# Patient Record
Sex: FEMALE | Race: BLACK OR AFRICAN AMERICAN | NOT HISPANIC OR LATINO
[De-identification: names, ages, dates, MRNs, and addresses within clinical notes are randomized per-mention and may not be internally consistent; named-entity substitution may affect disease eponyms.]

---

## 2020-02-12 ENCOUNTER — APPOINTMENT (OUTPATIENT)
Dept: PULMONOLOGY | Facility: CLINIC | Age: 45
End: 2020-02-12
Payer: COMMERCIAL

## 2020-02-12 ENCOUNTER — NON-APPOINTMENT (OUTPATIENT)
Age: 45
End: 2020-02-12

## 2020-02-12 ENCOUNTER — LABORATORY RESULT (OUTPATIENT)
Age: 45
End: 2020-02-12

## 2020-02-12 ENCOUNTER — APPOINTMENT (OUTPATIENT)
Dept: CARDIOLOGY | Facility: CLINIC | Age: 45
End: 2020-02-12
Payer: COMMERCIAL

## 2020-02-12 VITALS
OXYGEN SATURATION: 97 % | BODY MASS INDEX: 50.02 KG/M2 | HEART RATE: 94 BPM | DIASTOLIC BLOOD PRESSURE: 90 MMHG | SYSTOLIC BLOOD PRESSURE: 138 MMHG | WEIGHT: 293 LBS | TEMPERATURE: 98.1 F | HEIGHT: 64 IN

## 2020-02-12 VITALS — SYSTOLIC BLOOD PRESSURE: 130 MMHG | DIASTOLIC BLOOD PRESSURE: 90 MMHG

## 2020-02-12 DIAGNOSIS — Z82.3 FAMILY HISTORY OF STROKE: ICD-10-CM

## 2020-02-12 DIAGNOSIS — M25.561 PAIN IN RIGHT KNEE: ICD-10-CM

## 2020-02-12 DIAGNOSIS — R25.2 CRAMP AND SPASM: ICD-10-CM

## 2020-02-12 DIAGNOSIS — Z80.1 FAMILY HISTORY OF MALIGNANT NEOPLASM OF TRACHEA, BRONCHUS AND LUNG: ICD-10-CM

## 2020-02-12 DIAGNOSIS — R09.81 NASAL CONGESTION: ICD-10-CM

## 2020-02-12 PROCEDURE — 99386 PREV VISIT NEW AGE 40-64: CPT

## 2020-02-12 PROCEDURE — 93000 ELECTROCARDIOGRAM COMPLETE: CPT

## 2020-02-12 PROCEDURE — 71046 X-RAY EXAM CHEST 2 VIEWS: CPT

## 2020-02-12 NOTE — PHYSICAL EXAM
[Well Developed] : well developed [No Acute Distress] : no acute distress [Well Nourished] : well nourished [Well-Appearing] : well-appearing [Normal Sclera/Conjunctiva] : normal sclera/conjunctiva [EOMI] : extraocular movements intact [PERRL] : pupils equal round and reactive to light [Normal Outer Ear/Nose] : the outer ears and nose were normal in appearance [No JVD] : no jugular venous distention [No Lymphadenopathy] : no lymphadenopathy [Supple] : supple [Thyroid Normal, No Nodules] : the thyroid was normal and there were no nodules present [No Respiratory Distress] : no respiratory distress  [Clear to Auscultation] : lungs were clear to auscultation bilaterally [Normal Rate] : normal rate  [No Accessory Muscle Use] : no accessory muscle use [Normal S1, S2] : normal S1 and S2 [Regular Rhythm] : with a regular rhythm [No Murmur] : no murmur heard [No Carotid Bruits] : no carotid bruits [No Abdominal Bruit] : a ~M bruit was not heard ~T in the abdomen [No Varicosities] : no varicosities [Pedal Pulses Present] : the pedal pulses are present [No Edema] : there was no peripheral edema [No Palpable Aorta] : no palpable aorta [No Extremity Clubbing/Cyanosis] : no extremity clubbing/cyanosis [Soft] : abdomen soft [Non Tender] : non-tender [No Masses] : no abdominal mass palpated [No HSM] : no HSM [Non-distended] : non-distended [Normal Bowel Sounds] : normal bowel sounds [Normal Posterior Cervical Nodes] : no posterior cervical lymphadenopathy [Normal Anterior Cervical Nodes] : no anterior cervical lymphadenopathy [No CVA Tenderness] : no CVA  tenderness [No Spinal Tenderness] : no spinal tenderness [No Joint Swelling] : no joint swelling [Grossly Normal Strength/Tone] : grossly normal strength/tone [No Rash] : no rash [Coordination Grossly Intact] : coordination grossly intact [Normal Gait] : normal gait [No Focal Deficits] : no focal deficits [Normal Affect] : the affect was normal [Normal Insight/Judgement] : insight and judgment were intact [Deep Tendon Reflexes (DTR)] : deep tendon reflexes were 2+ and symmetric [de-identified] : Mild erythema to the oropharynx, nasal turbinates pink and boggy

## 2020-02-12 NOTE — HISTORY OF PRESENT ILLNESS
[de-identified] : This is a 45 year old lady with a PMH of CHF, Obesity, Current Smoker,  and HTN presents today to establish care at our office. Patient states that she is overall feeling OK. Patient states that she has been feeling congested since last week. Patient denies sinus pressure, sore throat, cough and fever. Patient states that she went to Urgent care and she was prescribed Flonase, which she has been using infrequently. Patient states that she has been experiencing some left leg cramping at night. Patient also with chronic right knee pain that hurts while she walks. She states that she hears a "clicking" noise when she walks. Patient is a current smoker, smokes 6 cigarettes/day.  [FreeTextEntry1] : Establish Care at our office

## 2020-02-17 LAB
25(OH)D3 SERPL-MCNC: 10.4 NG/ML
ANION GAP SERPL CALC-SCNC: 14 MMOL/L
APPEARANCE: ABNORMAL
BACTERIA: NEGATIVE
BASOPHILS # BLD AUTO: 0.03 K/UL
BASOPHILS NFR BLD AUTO: 0.3 %
BILIRUBIN URINE: NEGATIVE
BLOOD URINE: ABNORMAL
BUN SERPL-MCNC: 16 MG/DL
CALCIUM OXALATE CRYSTALS: ABNORMAL
CALCIUM SERPL-MCNC: 10 MG/DL
CHLORIDE SERPL-SCNC: 103 MMOL/L
CHOLEST SERPL-MCNC: 167 MG/DL
CHOLEST/HDLC SERPL: 3.9 RATIO
CO2 SERPL-SCNC: 23 MMOL/L
COLOR: YELLOW
CREAT SERPL-MCNC: 1.38 MG/DL
EOSINOPHIL # BLD AUTO: 0.32 K/UL
EOSINOPHIL NFR BLD AUTO: 2.9 %
ESTIMATED AVERAGE GLUCOSE: 131 MG/DL
GLUCOSE QUALITATIVE U: NEGATIVE
GLUCOSE SERPL-MCNC: 85 MG/DL
HBA1C MFR BLD HPLC: 6.2 %
HCT VFR BLD CALC: 35.8 %
HDLC SERPL-MCNC: 42 MG/DL
HGB BLD-MCNC: 10.6 G/DL
HYALINE CASTS: 0 /LPF
IMM GRANULOCYTES NFR BLD AUTO: 0.7 %
KETONES URINE: NEGATIVE
LDLC SERPL CALC-MCNC: 83 MG/DL
LEUKOCYTE ESTERASE URINE: NEGATIVE
LYMPHOCYTES # BLD AUTO: 3.19 K/UL
LYMPHOCYTES NFR BLD AUTO: 28.9 %
MAGNESIUM SERPL-MCNC: 2.3 MG/DL
MAN DIFF?: NORMAL
MCHC RBC-ENTMCNC: 25 PG
MCHC RBC-ENTMCNC: 29.6 GM/DL
MCV RBC AUTO: 84.4 FL
MICROSCOPIC-UA: NORMAL
MONOCYTES # BLD AUTO: 0.51 K/UL
MONOCYTES NFR BLD AUTO: 4.6 %
NEUTROPHILS # BLD AUTO: 6.89 K/UL
NEUTROPHILS NFR BLD AUTO: 62.6 %
NITRITE URINE: NEGATIVE
NT-PROBNP SERPL-MCNC: 341 PG/ML
PH URINE: 6
PHOSPHATE SERPL-MCNC: 4.6 MG/DL
PLATELET # BLD AUTO: 315 K/UL
POTASSIUM SERPL-SCNC: 4 MMOL/L
PROTEIN URINE: ABNORMAL
RBC # BLD: 4.24 M/UL
RBC # FLD: 16.2 %
RED BLOOD CELLS URINE: 12 /HPF
SODIUM SERPL-SCNC: 141 MMOL/L
SPECIFIC GRAVITY URINE: 1.03
SQUAMOUS EPITHELIAL CELLS: 6 /HPF
T3RU NFR SERPL: 1 TBI
T4 FREE SERPL-MCNC: 1 NG/DL
T4 SERPL-MCNC: 5.7 UG/DL
TRIGL SERPL-MCNC: 209 MG/DL
TSH SERPL-ACNC: 1.78 UIU/ML
UROBILINOGEN URINE: NORMAL
WBC # FLD AUTO: 11.02 K/UL
WHITE BLOOD CELLS URINE: 5 /HPF

## 2020-02-19 ENCOUNTER — APPOINTMENT (OUTPATIENT)
Dept: CT IMAGING | Facility: CLINIC | Age: 45
End: 2020-02-19

## 2020-02-19 ENCOUNTER — APPOINTMENT (OUTPATIENT)
Dept: CARDIOLOGY | Facility: CLINIC | Age: 45
End: 2020-02-19
Payer: COMMERCIAL

## 2020-02-19 PROCEDURE — 78452 HT MUSCLE IMAGE SPECT MULT: CPT

## 2020-02-19 PROCEDURE — 93306 TTE W/DOPPLER COMPLETE: CPT

## 2020-02-19 PROCEDURE — A9500: CPT

## 2020-02-19 PROCEDURE — 93015 CV STRESS TEST SUPVJ I&R: CPT

## 2020-02-20 ENCOUNTER — APPOINTMENT (OUTPATIENT)
Dept: CARDIOLOGY | Facility: CLINIC | Age: 45
End: 2020-02-20

## 2020-02-24 ENCOUNTER — APPOINTMENT (OUTPATIENT)
Dept: CARDIOLOGY | Facility: CLINIC | Age: 45
End: 2020-02-24
Payer: COMMERCIAL

## 2020-02-24 ENCOUNTER — NON-APPOINTMENT (OUTPATIENT)
Age: 45
End: 2020-02-24

## 2020-02-24 VITALS
SYSTOLIC BLOOD PRESSURE: 140 MMHG | HEART RATE: 90 BPM | BODY MASS INDEX: 50.02 KG/M2 | OXYGEN SATURATION: 99 % | DIASTOLIC BLOOD PRESSURE: 90 MMHG | TEMPERATURE: 98.1 F | WEIGHT: 293 LBS | HEIGHT: 64 IN

## 2020-02-24 DIAGNOSIS — Z86.79 PERSONAL HISTORY OF OTHER DISEASES OF THE CIRCULATORY SYSTEM: ICD-10-CM

## 2020-02-24 DIAGNOSIS — Z86.39 PERSONAL HISTORY OF OTHER ENDOCRINE, NUTRITIONAL AND METABOLIC DISEASE: ICD-10-CM

## 2020-02-24 DIAGNOSIS — R94.39 ABNORMAL RESULT OF OTHER CARDIOVASCULAR FUNCTION STUDY: ICD-10-CM

## 2020-02-24 PROCEDURE — 93000 ELECTROCARDIOGRAM COMPLETE: CPT

## 2020-02-24 PROCEDURE — 99214 OFFICE O/P EST MOD 30 MIN: CPT

## 2020-02-24 RX ORDER — LISINOPRIL 40 MG/1
40 TABLET ORAL DAILY
Qty: 90 | Refills: 3 | Status: DISCONTINUED | COMMUNITY
End: 2020-02-24

## 2020-02-24 NOTE — PHYSICAL EXAM
[FreeTextEntry1] : obese [Normal Conjunctiva] : the conjunctiva exhibited no abnormalities [Eyelids - No Xanthelasma] : the eyelids demonstrated no xanthelasmas [Normal Oral Mucosa] : normal oral mucosa [No Oral Pallor] : no oral pallor [No Oral Cyanosis] : no oral cyanosis [Normal Jugular Venous A Waves Present] : normal jugular venous A waves present [Normal Jugular Venous V Waves Present] : normal jugular venous V waves present [No Jugular Venous Clay A Waves] : no jugular venous clay A waves [Respiration, Rhythm And Depth] : normal respiratory rhythm and effort [Exaggerated Use Of Accessory Muscles For Inspiration] : no accessory muscle use [Auscultation Breath Sounds / Voice Sounds] : lungs were clear to auscultation bilaterally [Heart Rate And Rhythm] : heart rate and rhythm were normal [Heart Sounds] : normal S1 and S2 [Murmurs] : no murmurs present [Abdomen Soft] : soft [Abdomen Tenderness] : non-tender [Abdomen Mass (___ Cm)] : no abdominal mass palpated [Abnormal Walk] : normal gait [Gait - Sufficient For Exercise Testing] : the gait was sufficient for exercise testing [Nail Clubbing] : no clubbing of the fingernails [Cyanosis, Localized] : no localized cyanosis [Petechial Hemorrhages (___cm)] : no petechial hemorrhages [Skin Color & Pigmentation] : normal skin color and pigmentation [] : no rash [No Venous Stasis] : no venous stasis [Skin Lesions] : no skin lesions [No Skin Ulcers] : no skin ulcer [No Xanthoma] : no  xanthoma was observed [Oriented To Time, Place, And Person] : oriented to person, place, and time [Affect] : the affect was normal [Mood] : the mood was normal [No Anxiety] : not feeling anxious

## 2020-02-24 NOTE — HISTORY OF PRESENT ILLNESS
[FreeTextEntry1] : pt presents for f/u cv testing .pt with hx of chf s/p echo mod decreased ef  35-40% .pt s/p nuclear stress testing mild apical ischemia .pt with exertional dyspnea .pt denies chest discomfort denies snoring pt denies any chest  pain dizziness ,lightheadedness ,nausea vomiting diaphoresis\par

## 2020-02-28 ENCOUNTER — OUTPATIENT (OUTPATIENT)
Dept: OUTPATIENT SERVICES | Facility: HOSPITAL | Age: 45
LOS: 1 days | End: 2020-02-28
Payer: COMMERCIAL

## 2020-02-28 VITALS
SYSTOLIC BLOOD PRESSURE: 177 MMHG | RESPIRATION RATE: 18 BRPM | HEART RATE: 100 BPM | DIASTOLIC BLOOD PRESSURE: 93 MMHG | HEIGHT: 64 IN | OXYGEN SATURATION: 97 % | TEMPERATURE: 98 F | WEIGHT: 279.99 LBS

## 2020-02-28 DIAGNOSIS — R94.39 ABNORMAL RESULT OF OTHER CARDIOVASCULAR FUNCTION STUDY: ICD-10-CM

## 2020-02-28 LAB
ALBUMIN SERPL ELPH-MCNC: 3.9 G/DL — SIGNIFICANT CHANGE UP (ref 3.3–5)
ALP SERPL-CCNC: 106 U/L — SIGNIFICANT CHANGE UP (ref 40–120)
ALT FLD-CCNC: 5 U/L — LOW (ref 10–45)
ANION GAP SERPL CALC-SCNC: 10 MMOL/L — SIGNIFICANT CHANGE UP (ref 5–17)
AST SERPL-CCNC: 8 U/L — LOW (ref 10–40)
BILIRUB SERPL-MCNC: 0.5 MG/DL — SIGNIFICANT CHANGE UP (ref 0.2–1.2)
BUN SERPL-MCNC: 11 MG/DL — SIGNIFICANT CHANGE UP (ref 7–23)
CALCIUM SERPL-MCNC: 9.2 MG/DL — SIGNIFICANT CHANGE UP (ref 8.4–10.5)
CHLORIDE SERPL-SCNC: 102 MMOL/L — SIGNIFICANT CHANGE UP (ref 96–108)
CO2 SERPL-SCNC: 23 MMOL/L — SIGNIFICANT CHANGE UP (ref 22–31)
CREAT SERPL-MCNC: 1.25 MG/DL — SIGNIFICANT CHANGE UP (ref 0.5–1.3)
GLUCOSE SERPL-MCNC: 105 MG/DL — HIGH (ref 70–99)
HCT VFR BLD CALC: 35.4 % — SIGNIFICANT CHANGE UP (ref 34.5–45)
HGB BLD-MCNC: 10.5 G/DL — LOW (ref 11.5–15.5)
MCHC RBC-ENTMCNC: 24.4 PG — LOW (ref 27–34)
MCHC RBC-ENTMCNC: 29.7 GM/DL — LOW (ref 32–36)
MCV RBC AUTO: 82.1 FL — SIGNIFICANT CHANGE UP (ref 80–100)
NRBC # BLD: 0 /100 WBCS — SIGNIFICANT CHANGE UP (ref 0–0)
PLATELET # BLD AUTO: 339 K/UL — SIGNIFICANT CHANGE UP (ref 150–400)
POTASSIUM SERPL-MCNC: 4.1 MMOL/L — SIGNIFICANT CHANGE UP (ref 3.5–5.3)
POTASSIUM SERPL-SCNC: 4.1 MMOL/L — SIGNIFICANT CHANGE UP (ref 3.5–5.3)
PROT SERPL-MCNC: 7.7 G/DL — SIGNIFICANT CHANGE UP (ref 6–8.3)
RBC # BLD: 4.31 M/UL — SIGNIFICANT CHANGE UP (ref 3.8–5.2)
RBC # FLD: 15.7 % — HIGH (ref 10.3–14.5)
SODIUM SERPL-SCNC: 135 MMOL/L — SIGNIFICANT CHANGE UP (ref 135–145)
WBC # BLD: 9.34 K/UL — SIGNIFICANT CHANGE UP (ref 3.8–10.5)
WBC # FLD AUTO: 9.34 K/UL — SIGNIFICANT CHANGE UP (ref 3.8–10.5)

## 2020-02-28 PROCEDURE — C1887: CPT

## 2020-02-28 PROCEDURE — 99152 MOD SED SAME PHYS/QHP 5/>YRS: CPT

## 2020-02-28 PROCEDURE — 80053 COMPREHEN METABOLIC PANEL: CPT

## 2020-02-28 PROCEDURE — 85027 COMPLETE CBC AUTOMATED: CPT

## 2020-02-28 PROCEDURE — 93456 R HRT CORONARY ARTERY ANGIO: CPT

## 2020-02-28 PROCEDURE — 93456 R HRT CORONARY ARTERY ANGIO: CPT | Mod: 26

## 2020-02-28 PROCEDURE — C1894: CPT

## 2020-02-28 PROCEDURE — C1769: CPT

## 2020-02-28 NOTE — H&P CARDIOLOGY - HISTORY OF PRESENT ILLNESS
44 yo   F PMhx CHF class 2 systolic CHF, HTN,obesity ( BMI 48)  SMOKER presents for elective cardiac cath after having a positive stress test  (mild apical ischemia) with Dr Crawford last week. Patient had the routine stress test and ECHO  because she changed her PMD/ cardiologist to Dr Crawford. She has no fever, chills, N/V/D, abd pain, chest pain, SOB, WICK, orthopnea. No hx of implantable cardiac devices.     Symptoms:        Angina (Class): none        Ischemic Symptoms:  none     Heart Failure:        Systolic/Diastolic/Combined:  systolic        NYHA Class (within 2 weeks): class 2     Assessment of LVEF:       EF:  35-40%       Assessed by: echo @ Dr Crawford        Date: 2/2020     Prior Cardiac Interventions:       PCI's: none       CABG:     Noninvasive Testing:   Stress Test: Date: 2/2020        Protocol:        Duration of Exercise:        Symptoms:        EKG Changes:        DTS:        Myocardial Imaging: mild apical ischemia        Risk Assessment:     Echo:     Antianginal Therapies:        Beta Blockers:         Calcium Channel Blockers:        Long Acting Nitrates:        Ranexa:     Associated Risk Factors:        Cerebrovascular Disease: N/A       Chronic Lung Disease: N/A       Peripheral Arterial Disease: N/A       Chronic Kidney Disease (if yes, what is GFR): N/A       Uncontrolled Diabetes (if yes, what is HgbA1C or FBS): N/A       Poorly Controlled Hypertension (if yes, what is SBP): 177        Morbid Obesity (if yes, what is BMI): 48       History of Recent Ventricular Arrhythmia: N/A       Inability to Ambulate Safely: N/A       Need for Therapeutic Anticoagulation: N/A       Antiplatelet or Contrast Allergy: N/A

## 2020-02-28 NOTE — H&P CARDIOLOGY - FAMILY HISTORY
Father  Still living? No  Family history of cerebrovascular accident (CVA), Age at diagnosis: Age Unknown     Mother  Still living? No  Family history of lung cancer, Age at diagnosis: Age Unknown

## 2020-02-28 NOTE — H&P CARDIOLOGY - PMH
CHF (congestive heart failure), NYHA class II, chronic, systolic    Class 3 severe obesity with body mass index (BMI) greater than or equal to 70 in adult, unspecified obesity type, unspecified whether serious comorbidity present    Essential hypertension    Smoker

## 2020-02-29 LAB
ANION GAP SERPL CALC-SCNC: 9 MMOL/L
APPEARANCE: ABNORMAL
BACTERIA UR CULT: NORMAL
BACTERIA: NEGATIVE
BASOPHILS # BLD AUTO: 0.04 K/UL
BASOPHILS NFR BLD AUTO: 0.5 %
BILIRUBIN URINE: NEGATIVE
BLOOD URINE: NEGATIVE
BUN SERPL-MCNC: 14 MG/DL
CALCIUM OXALATE CRYSTALS: ABNORMAL
CALCIUM SERPL-MCNC: 9 MG/DL
CHLORIDE SERPL-SCNC: 106 MMOL/L
CO2 SERPL-SCNC: 24 MMOL/L
COLOR: YELLOW
CREAT SERPL-MCNC: 1.36 MG/DL
CREAT SPEC-SCNC: 336 MG/DL
EOSINOPHIL # BLD AUTO: 0.22 K/UL
EOSINOPHIL NFR BLD AUTO: 2.5 %
GLUCOSE QUALITATIVE U: NEGATIVE
GLUCOSE SERPL-MCNC: 111 MG/DL
HCT VFR BLD CALC: 36.8 %
HGB BLD-MCNC: 10.6 G/DL
HYALINE CASTS: 0 /LPF
IMM GRANULOCYTES NFR BLD AUTO: 0.3 %
KETONES URINE: NEGATIVE
LEUKOCYTE ESTERASE URINE: NEGATIVE
LYMPHOCYTES # BLD AUTO: 2.65 K/UL
LYMPHOCYTES NFR BLD AUTO: 30.6 %
MAN DIFF?: NORMAL
MCHC RBC-ENTMCNC: 24.5 PG
MCHC RBC-ENTMCNC: 28.8 GM/DL
MCV RBC AUTO: 85.2 FL
MICROALBUMIN 24H UR DL<=1MG/L-MCNC: 2.8 MG/DL
MICROALBUMIN/CREAT 24H UR-RTO: 8 MG/G
MICROSCOPIC-UA: NORMAL
MONOCYTES # BLD AUTO: 0.39 K/UL
MONOCYTES NFR BLD AUTO: 4.5 %
NEUTROPHILS # BLD AUTO: 5.32 K/UL
NEUTROPHILS NFR BLD AUTO: 61.6 %
NITRITE URINE: NEGATIVE
PH URINE: 5.5
PLATELET # BLD AUTO: 372 K/UL
POTASSIUM SERPL-SCNC: 4.2 MMOL/L
PROTEIN URINE: ABNORMAL
RBC # BLD: 4.32 M/UL
RBC # FLD: 16.2 %
RED BLOOD CELLS URINE: 5 /HPF
SODIUM SERPL-SCNC: 139 MMOL/L
SPECIFIC GRAVITY URINE: 1.03
SQUAMOUS EPITHELIAL CELLS: 6 /HPF
UROBILINOGEN URINE: ABNORMAL
WBC # FLD AUTO: 8.65 K/UL
WHITE BLOOD CELLS URINE: 2 /HPF

## 2020-03-09 PROBLEM — E66.01 MORBID (SEVERE) OBESITY DUE TO EXCESS CALORIES: Chronic | Status: ACTIVE | Noted: 2020-02-28

## 2020-03-09 PROBLEM — I50.22 CHRONIC SYSTOLIC (CONGESTIVE) HEART FAILURE: Chronic | Status: ACTIVE | Noted: 2020-02-28

## 2020-03-09 PROBLEM — I10 ESSENTIAL (PRIMARY) HYPERTENSION: Chronic | Status: ACTIVE | Noted: 2020-02-28

## 2020-03-09 PROBLEM — F17.200 NICOTINE DEPENDENCE, UNSPECIFIED, UNCOMPLICATED: Chronic | Status: ACTIVE | Noted: 2020-02-28

## 2020-04-01 ENCOUNTER — APPOINTMENT (OUTPATIENT)
Dept: CARDIOLOGY | Facility: CLINIC | Age: 45
End: 2020-04-01

## 2020-05-19 ENCOUNTER — APPOINTMENT (OUTPATIENT)
Dept: CARDIOLOGY | Facility: CLINIC | Age: 45
End: 2020-05-19
Payer: COMMERCIAL

## 2020-05-19 DIAGNOSIS — Z98.890 OTHER SPECIFIED POSTPROCEDURAL STATES: ICD-10-CM

## 2020-05-19 DIAGNOSIS — R82.90 UNSPECIFIED ABNORMAL FINDINGS IN URINE: ICD-10-CM

## 2020-05-19 PROCEDURE — 99214 OFFICE O/P EST MOD 30 MIN: CPT | Mod: 95

## 2020-05-24 NOTE — HISTORY OF PRESENT ILLNESS
[Home] : at home, [unfilled] , at the time of the visit. [Medical Office: ([facility])___] : at the medical office located in  [Patient] : the patient [Time Spent: ___ minutes] : I have spent [unfilled] minutes with the patient on the telephone [FreeTextEntry2] : Eren Adorno  [FreeTextEntry1] : This is a 45 year old lady with a PMH of Cardiomyopathy, Obesity, and Vitamin D deficiency presents today for an acute visit via tele-health. Patient states that she began experiencing symptoms of covid-19, including anosmia on Monday May 11, 2020. Patient was swabbed positive on May 12, 2020 and states that she has not experienced any further symptoms at this time (including shortness of breath, fever or  body aches). Patient explains that at that time her COVID Antibodies were negative. Patient states that she is otherwise feeling fine.

## 2020-05-30 ENCOUNTER — RX RENEWAL (OUTPATIENT)
Age: 45
End: 2020-05-30

## 2020-06-03 ENCOUNTER — APPOINTMENT (OUTPATIENT)
Dept: PULMONOLOGY | Facility: CLINIC | Age: 45
End: 2020-06-03
Payer: COMMERCIAL

## 2020-06-03 PROCEDURE — 99205 OFFICE O/P NEW HI 60 MIN: CPT | Mod: 95

## 2020-06-03 RX ORDER — AZITHROMYCIN 250 MG/1
250 TABLET, FILM COATED ORAL
Qty: 1 | Refills: 0 | Status: DISCONTINUED | COMMUNITY
Start: 2020-02-12 | End: 2020-06-03

## 2020-06-03 NOTE — HISTORY OF PRESENT ILLNESS
[Current] : current [Home] : at home, [unfilled] , at the time of the visit. [Medical Office: ([facility])___] : at the medical office located in  [TextBox_4] : This visit was provided via telehealth using real-time 2-way audio visual technology. The patient, VALENTIN WOOD , was located at home, [id number]\[place] [address]  at the time of the visit.\par The provider, Gregg Nichole, was located at office [address] [place] [address] at the time of the visit. \par \par  The patient, Ms. VALENTIN WOOD  and Physician Gregg Chowdhury participated in the telehealth encounter.\par \par Verbal consent obtained by  from patient\par VALENTIN WOOD is a 45 year old  F referred for pulmonary evaluation for COVID and SOB.\par \par 5/11 developed symptoms. Stayed home for 2 weeks\par Back to work after labor day.\par Only symptom loss of smell.\par No significant cough, wheezing, chest pain or SOB.\par \par No imaging of chest\par \par Past pulmonary history. 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 Mother lung cancer smoker\par Recent travel\par Pets Dog  Not allergic.\par \par Swab positive at [facility]. Then antibody negative.\par \par \par Had WICK on stress test. [TextBox_11] : 1/2 [TextBox_13] : 20 years

## 2020-06-03 NOTE — ASSESSMENT
[FreeTextEntry1] : For office evaluation, pulmonary function testing, and chest radiograph.\par Follow-up sooner on a as needed basis.

## 2020-06-03 NOTE — DISCUSSION/SUMMARY
[FreeTextEntry1] : WICK most likely related to weight and conditioning.. \par S/P possible COVID but no history consistent with significant pulmonary involvement.

## 2020-07-08 ENCOUNTER — APPOINTMENT (OUTPATIENT)
Dept: PULMONOLOGY | Facility: CLINIC | Age: 45
End: 2020-07-08
Payer: COMMERCIAL

## 2020-07-08 VITALS
DIASTOLIC BLOOD PRESSURE: 86 MMHG | OXYGEN SATURATION: 99 % | SYSTOLIC BLOOD PRESSURE: 139 MMHG | TEMPERATURE: 97.8 F | HEART RATE: 101 BPM | RESPIRATION RATE: 16 BRPM

## 2020-07-08 DIAGNOSIS — U07.1 COVID-19: ICD-10-CM

## 2020-07-08 DIAGNOSIS — R53.83 OTHER FATIGUE: ICD-10-CM

## 2020-07-08 PROCEDURE — 99244 OFF/OP CNSLTJ NEW/EST MOD 40: CPT

## 2020-07-08 NOTE — HISTORY OF PRESENT ILLNESS
[Awakes Unrefreshed] : awakes unrefreshed [Daytime Somnolence] : daytime somnolence [Fatigue] : fatigue [Frequent Nocturnal Awakening] : frequent nocturnal awakening [Nonrestorative Sleep] : nonrestorative sleep [TextBox_4] : VALENTIN WOOD is a 45 year old  F referred for pulmonary evaluation for\par \par Had COVID 5/11 only symptom was anosmia.\par No significant cough, wheezing, chest pain or SOB.\par \par Feeling well. \par Had CXR 2/20\par  \par Past pulmonary history. N\par Occupational Exposure. N\par Family history of pulmonary disease. M  Lung ca smoker\par Recent travel N\par Pets DOg not allergic\par

## 2020-07-08 NOTE — DISCUSSION/SUMMARY
[FreeTextEntry1] : Obesity.\par Probable obstructive sleep apnea syndrome.\par Status post COVID without significant respiratory symptomatology.

## 2020-07-08 NOTE — PHYSICAL EXAM
[Normal Appearance] : normal appearance [No Neck Mass] : no neck mass [Normal Oropharynx] : normal oropharynx [No Murmurs] : no murmurs [No JVD] : no jvd [Normal S1, S2] : normal s1, s2 [Normal to Percussion] : normal to percussion [Clear to Auscultation Bilaterally] : clear to auscultation bilaterally [No HSM] : no hsm [Benign] : benign [No Clubbing] : no clubbing [No Edema] : no edema [No Cyanosis] : no cyanosis [TextBox_2] : Overweight

## 2020-07-08 NOTE — ASSESSMENT
[FreeTextEntry1] : Full pulmonary function testing.\par For home sleep study.\par Further recommendations after review of above.

## 2020-07-29 ENCOUNTER — APPOINTMENT (OUTPATIENT)
Dept: CARDIOLOGY | Facility: CLINIC | Age: 45
End: 2020-07-29

## 2020-08-11 ENCOUNTER — APPOINTMENT (OUTPATIENT)
Dept: PULMONOLOGY | Facility: CLINIC | Age: 45
End: 2020-08-11

## 2020-08-23 ENCOUNTER — RX RENEWAL (OUTPATIENT)
Age: 45
End: 2020-08-23

## 2020-09-14 ENCOUNTER — APPOINTMENT (OUTPATIENT)
Dept: CARDIOLOGY | Facility: CLINIC | Age: 45
End: 2020-09-14

## 2021-04-28 ENCOUNTER — RX RENEWAL (OUTPATIENT)
Age: 46
End: 2021-04-28

## 2021-06-02 ENCOUNTER — LABORATORY RESULT (OUTPATIENT)
Age: 46
End: 2021-06-02

## 2021-06-02 ENCOUNTER — APPOINTMENT (OUTPATIENT)
Dept: CARDIOLOGY | Facility: CLINIC | Age: 46
End: 2021-06-02
Payer: COMMERCIAL

## 2021-06-02 ENCOUNTER — NON-APPOINTMENT (OUTPATIENT)
Age: 46
End: 2021-06-02

## 2021-06-02 VITALS
DIASTOLIC BLOOD PRESSURE: 96 MMHG | HEART RATE: 100 BPM | BODY MASS INDEX: 50.02 KG/M2 | SYSTOLIC BLOOD PRESSURE: 140 MMHG | OXYGEN SATURATION: 98 % | HEIGHT: 64 IN | TEMPERATURE: 98.6 F | WEIGHT: 293 LBS

## 2021-06-02 DIAGNOSIS — Z12.39 ENCOUNTER FOR OTHER SCREENING FOR MALIGNANT NEOPLASM OF BREAST: ICD-10-CM

## 2021-06-02 DIAGNOSIS — Z00.00 ENCOUNTER FOR GENERAL ADULT MEDICAL EXAMINATION W/OUT ABNORMAL FINDINGS: ICD-10-CM

## 2021-06-02 DIAGNOSIS — R89.9 UNSPECIFIED ABNORMAL FINDING IN SPECIMENS FROM OTHER ORGANS, SYSTEMS AND TISSUES: ICD-10-CM

## 2021-06-02 DIAGNOSIS — E61.1 IRON DEFICIENCY: ICD-10-CM

## 2021-06-02 DIAGNOSIS — Z71.89 OTHER SPECIFIED COUNSELING: ICD-10-CM

## 2021-06-02 PROCEDURE — 93000 ELECTROCARDIOGRAM COMPLETE: CPT

## 2021-06-02 PROCEDURE — 99072 ADDL SUPL MATRL&STAF TM PHE: CPT

## 2021-06-02 PROCEDURE — 99396 PREV VISIT EST AGE 40-64: CPT

## 2021-06-02 NOTE — PHYSICAL EXAM
[No Acute Distress] : no acute distress [Well Nourished] : well nourished [Well Developed] : well developed [Well-Appearing] : well-appearing [Normal Sclera/Conjunctiva] : normal sclera/conjunctiva [PERRL] : pupils equal round and reactive to light [EOMI] : extraocular movements intact [Normal Outer Ear/Nose] : the outer ears and nose were normal in appearance [Normal Oropharynx] : the oropharynx was normal [No JVD] : no jugular venous distention [No Lymphadenopathy] : no lymphadenopathy [Supple] : supple [Thyroid Normal, No Nodules] : the thyroid was normal and there were no nodules present [No Respiratory Distress] : no respiratory distress  [No Accessory Muscle Use] : no accessory muscle use [Clear to Auscultation] : lungs were clear to auscultation bilaterally [Normal Rate] : normal rate  [Regular Rhythm] : with a regular rhythm [Normal S1, S2] : normal S1 and S2 [No Murmur] : no murmur heard [No Carotid Bruits] : no carotid bruits [No Abdominal Bruit] : a ~M bruit was not heard ~T in the abdomen [No Varicosities] : no varicosities [Pedal Pulses Present] : the pedal pulses are present [No Edema] : there was no peripheral edema [No Palpable Aorta] : no palpable aorta [No Extremity Clubbing/Cyanosis] : no extremity clubbing/cyanosis [Soft] : abdomen soft [Non Tender] : non-tender [Non-distended] : non-distended [No Masses] : no abdominal mass palpated [No HSM] : no HSM [Normal Bowel Sounds] : normal bowel sounds [Normal Posterior Cervical Nodes] : no posterior cervical lymphadenopathy [Normal Anterior Cervical Nodes] : no anterior cervical lymphadenopathy [No CVA Tenderness] : no CVA  tenderness [No Spinal Tenderness] : no spinal tenderness [No Joint Swelling] : no joint swelling [Grossly Normal Strength/Tone] : grossly normal strength/tone [No Rash] : no rash [Coordination Grossly Intact] : coordination grossly intact [No Focal Deficits] : no focal deficits [Normal Gait] : normal gait [Normal Affect] : the affect was normal [Deep Tendon Reflexes (DTR)] : deep tendon reflexes were 2+ and symmetric [Normal Insight/Judgement] : insight and judgment were intact [de-identified] : obese

## 2021-06-02 NOTE — HISTORY OF PRESENT ILLNESS
[FreeTextEntry1] : Annual Wellness exam  [de-identified] : This is a 46 year old lady with a PMH of Cardiomyopathy, Anemia, HTN, Obesity, Current Smoker, and Vitamin D Deficiency presents today for her annual wellness exam. Patient states that she is feeling good and has no complaints at this time. Patient denies dyspnea, palpitations, chest pain, nausea, vomiting, dizziness and lightheadedness.\par

## 2021-06-03 ENCOUNTER — TRANSCRIPTION ENCOUNTER (OUTPATIENT)
Age: 46
End: 2021-06-03

## 2021-06-08 ENCOUNTER — NON-APPOINTMENT (OUTPATIENT)
Age: 46
End: 2021-06-08

## 2021-06-09 ENCOUNTER — APPOINTMENT (OUTPATIENT)
Dept: CARDIOLOGY | Facility: CLINIC | Age: 46
End: 2021-06-09
Payer: COMMERCIAL

## 2021-06-09 PROBLEM — R89.9 ABNORMAL LABORATORY TEST: Status: ACTIVE | Noted: 2021-06-09

## 2021-06-09 PROBLEM — E61.1 IRON DEFICIENCY: Status: ACTIVE | Noted: 2021-06-09

## 2021-06-09 LAB
25(OH)D3 SERPL-MCNC: 24.8 NG/ML
ANION GAP SERPL CALC-SCNC: 13 MMOL/L
APPEARANCE: CLEAR
BACTERIA: NEGATIVE
BASOPHILS # BLD AUTO: 0.05 K/UL
BASOPHILS NFR BLD AUTO: 0.5 %
BILIRUBIN URINE: NEGATIVE
BLOOD URINE: NEGATIVE
BUN SERPL-MCNC: 12 MG/DL
CALCIUM SERPL-MCNC: 9.4 MG/DL
CHLORIDE SERPL-SCNC: 104 MMOL/L
CHOLEST SERPL-MCNC: 173 MG/DL
CO2 SERPL-SCNC: 23 MMOL/L
COLOR: YELLOW
CREAT SERPL-MCNC: 1.33 MG/DL
EOSINOPHIL # BLD AUTO: 0.31 K/UL
EOSINOPHIL NFR BLD AUTO: 3.3 %
ESTIMATED AVERAGE GLUCOSE: 140 MG/DL
FERRITIN SERPL-MCNC: 39 NG/ML
FOLATE SERPL-MCNC: 9 NG/ML
GLUCOSE QUALITATIVE U: NEGATIVE
GLUCOSE SERPL-MCNC: 91 MG/DL
HBA1C MFR BLD HPLC: 6.5 %
HCT VFR BLD CALC: 36.7 %
HDLC SERPL-MCNC: 35 MG/DL
HGB BLD-MCNC: 10.9 G/DL
HYALINE CASTS: 0 /LPF
IMM GRANULOCYTES NFR BLD AUTO: 0.4 %
IRON SATN MFR SERPL: 6 %
IRON SERPL-MCNC: 24 UG/DL
KETONES URINE: NEGATIVE
LDLC SERPL CALC-MCNC: 117 MG/DL
LEUKOCYTE ESTERASE URINE: NEGATIVE
LYMPHOCYTES # BLD AUTO: 3.29 K/UL
LYMPHOCYTES NFR BLD AUTO: 35.5 %
MAGNESIUM SERPL-MCNC: 2.2 MG/DL
MAN DIFF?: NORMAL
MCHC RBC-ENTMCNC: 24.3 PG
MCHC RBC-ENTMCNC: 29.7 GM/DL
MCV RBC AUTO: 81.9 FL
MICROSCOPIC-UA: NORMAL
MONOCYTES # BLD AUTO: 0.53 K/UL
MONOCYTES NFR BLD AUTO: 5.7 %
NEUTROPHILS # BLD AUTO: 5.05 K/UL
NEUTROPHILS NFR BLD AUTO: 54.6 %
NITRITE URINE: NEGATIVE
NONHDLC SERPL-MCNC: 138 MG/DL
PH URINE: 6
PHOSPHATE SERPL-MCNC: 2.4 MG/DL
PLATELET # BLD AUTO: 373 K/UL
POTASSIUM SERPL-SCNC: 4 MMOL/L
PROTEIN URINE: ABNORMAL
RBC # BLD: 4.48 M/UL
RBC # FLD: 15.3 %
RED BLOOD CELLS URINE: 2 /HPF
SODIUM SERPL-SCNC: 140 MMOL/L
SPECIFIC GRAVITY URINE: 1.03
SQUAMOUS EPITHELIAL CELLS: 2 /HPF
T3RU NFR SERPL: 1 TBI
T4 FREE SERPL-MCNC: 1 NG/DL
T4 SERPL-MCNC: 5.6 UG/DL
TIBC SERPL-MCNC: 379 UG/DL
TRANSFERRIN SERPL-MCNC: 300 MG/DL
TRIGL SERPL-MCNC: 104 MG/DL
TSH SERPL-ACNC: 2.24 UIU/ML
UIBC SERPL-MCNC: 355 UG/DL
URATE SERPL-MCNC: 7.6 MG/DL
UROBILINOGEN URINE: NORMAL
VIT B12 SERPL-MCNC: 472 PG/ML
WBC # FLD AUTO: 9.27 K/UL
WHITE BLOOD CELLS URINE: 0 /HPF

## 2021-06-09 PROCEDURE — 99072 ADDL SUPL MATRL&STAF TM PHE: CPT

## 2021-06-09 PROCEDURE — 93306 TTE W/DOPPLER COMPLETE: CPT

## 2021-08-03 ENCOUNTER — APPOINTMENT (OUTPATIENT)
Dept: CARDIOLOGY | Facility: CLINIC | Age: 46
End: 2021-08-03

## 2022-02-02 ENCOUNTER — APPOINTMENT (OUTPATIENT)
Dept: BARIATRICS | Facility: CLINIC | Age: 47
End: 2022-02-02

## 2022-02-06 ENCOUNTER — RESULT CHARGE (OUTPATIENT)
Age: 47
End: 2022-02-06

## 2022-02-07 DIAGNOSIS — Z01.812 ENCOUNTER FOR PREPROCEDURAL LABORATORY EXAMINATION: ICD-10-CM

## 2022-02-08 ENCOUNTER — NON-APPOINTMENT (OUTPATIENT)
Age: 47
End: 2022-02-08

## 2022-02-14 ENCOUNTER — APPOINTMENT (OUTPATIENT)
Dept: CARDIOLOGY | Facility: CLINIC | Age: 47
End: 2022-02-14
Payer: COMMERCIAL

## 2022-02-14 ENCOUNTER — NON-APPOINTMENT (OUTPATIENT)
Age: 47
End: 2022-02-14

## 2022-02-14 VITALS
OXYGEN SATURATION: 99 % | HEIGHT: 64 IN | HEART RATE: 97 BPM | BODY MASS INDEX: 50.02 KG/M2 | WEIGHT: 293 LBS | DIASTOLIC BLOOD PRESSURE: 80 MMHG | TEMPERATURE: 98 F | SYSTOLIC BLOOD PRESSURE: 108 MMHG

## 2022-02-14 PROCEDURE — 93000 ELECTROCARDIOGRAM COMPLETE: CPT

## 2022-02-14 PROCEDURE — 99214 OFFICE O/P EST MOD 30 MIN: CPT

## 2022-02-14 RX ORDER — ERGOCALCIFEROL 1.25 MG/1
1.25 MG CAPSULE, LIQUID FILLED ORAL
Qty: 6 | Refills: 0 | Status: DISCONTINUED | COMMUNITY
Start: 2020-02-20 | End: 2022-02-14

## 2022-02-14 NOTE — HISTORY OF PRESENT ILLNESS
[FreeTextEntry1] : This is a 48yo female with a PMH of Cardiomyopathy, Anemia, HTN, Obesity, Current Smoker, and Vitamin D Deficiency presents today for hospital follow-up. Patient was d/c'd from the [facility] ED on 2/2/22 after experiencing abdominal pain, no imaging was done at this time. patient returned one week later on 2/8/22 with severe abdominal pain and US abdomen done which found gallstones. She was d/c'd with Augmentin x 1 week and Pantoprazole 40mg daily. . Patient has appt today to see GI. Patient with no further pain but reports she has been eating less as she is afraid it will trigger her pain again. No further issues or concerns for today. pt state she is also in the process of getting bariatric surgery.

## 2022-02-15 ENCOUNTER — APPOINTMENT (OUTPATIENT)
Dept: CARDIOLOGY | Facility: CLINIC | Age: 47
End: 2022-02-15
Payer: COMMERCIAL

## 2022-02-15 PROCEDURE — 93306 TTE W/DOPPLER COMPLETE: CPT

## 2022-02-28 ENCOUNTER — APPOINTMENT (OUTPATIENT)
Dept: SURGERY | Facility: CLINIC | Age: 47
End: 2022-02-28
Payer: COMMERCIAL

## 2022-02-28 VITALS
WEIGHT: 293 LBS | HEART RATE: 85 BPM | TEMPERATURE: 97.8 F | SYSTOLIC BLOOD PRESSURE: 82 MMHG | RESPIRATION RATE: 18 BRPM | HEIGHT: 64 IN | BODY MASS INDEX: 50.02 KG/M2 | OXYGEN SATURATION: 98 % | DIASTOLIC BLOOD PRESSURE: 52 MMHG

## 2022-02-28 PROCEDURE — 99205 OFFICE O/P NEW HI 60 MIN: CPT

## 2022-02-28 NOTE — HISTORY OF PRESENT ILLNESS
[de-identified] : VALENTIN  is a 47 year  female  here for a consultation for gallstones and morbid obesity.\par \par 3 weeks ago, the patient had an episode of severe epigastric abdominal pain radiating to the right side under her rib cage.  The pain eventually subsided, however then recurred 2 weeks ago, severe enough that she ended up in the emergency room.  She says she had a CT and ultrasound at the time, demonstrating gallstones and possible cholecystitis.  She was treated with antibiotics, pain resolved, and she was discharged home.  Since then, she has completed the course of antibiotics.  She reports no further episodes of abdominal pain.  She has been modifying her diet, eating mostly bland foods and small portions.  She reports no fevers.\par \par The patient has also been struggling with obesity for many years.  She has attempted several diet and exercise programs without success.  The excess weight is starting to significantly affect her health and lifestyle.  She is being treated for heart failure, with recently diagnosed elevation in creatinine that is being worked up.\par \par Medical history is significant for hypertension, renal insufficiency, CHF with EF 38 to 40%, obstructive sleep apnea, gallstones.\par Surgical history is significant for no abdominal surgeries.\par The patient denies prior history of blood clot or abnormal bleeding.\par The patient denies prior history of dysphagia or reflux.\par She was a smoker until just recently, and is committed to quitting.\par

## 2022-02-28 NOTE — PLAN
[FreeTextEntry1] : Laparoscopic cholecystectomy to be scheduled, and cardiology clearance is requested.\par \par Regarding treatment of morbid obesity, I have discussed my impression and treatment plan with the patient.  All surgical options were discussed including non-surgical treatments.  The patient would like to proceed with laparoscopic sleeve gastrectomy.  \par \par Benefits and risks of the planned surgery were discussed in depth, particularly leak, bleeding, sepsis, fistula, GERD/esophagitis possibly requiring prolonged medical therapy and/or endoscopic surveillance, blood clots, dysphagia, malnutrition, weight regain, prolonged hospital stay and the remote possibility of death.   Also discussed was the importance of adhering to the recommended nutritional guidelines and supplements and attending regular follow-up.  I reviewed the critical importance of behavioral modification and follow-up in order to optimize outcomes and avoid complications. The patient was given the opportunity to ask pertinent questions and expressed good understanding of the aforementioned issues.\par \par Plan will be for laparoscopic sleeve gastrectomy after completion of:\par - medical weight management program\par - upper endoscopy\par - nutritional evaluation\par - medical, pulmonary and cardiac clearance\par - renal evaluation\par - psychological evaluation

## 2022-02-28 NOTE — CONSULT LETTER
[Dear  ___] : Dear  [unfilled], [Consult Letter:] : I had the pleasure of evaluating your patient, [unfilled]. [Please see my note below.] : Please see my note below. [Consult Closing:] : Thank you very much for allowing me to participate in the care of this patient.  If you have any questions, please do not hesitate to contact me. [Sincerely,] : Sincerely, [FreeTextEntry2] : Dr. Crawford [FreeTextEntry3] : Lucas Diamond MD, FACS, FASMBS\par [facility]\par [address]\[place] [address]\par tel. [phone]\par fax [phone]\par  \par

## 2022-02-28 NOTE — ASSESSMENT
[FreeTextEntry1] : 47-year-old female with symptomatic gallstones.\par Also with longstanding history of morbid obesity (height 5 feet 4 inches, weight 294 pounds, BMI 50) with comorbidities of hypertension, CHF, renal insufficiency, and obstructive sleep apnea who is interested in bariatric surgery, specifically sleeve gastrectomy.\par \par I have recommended laparoscopic cholecystectomy.\par \par The patient has failed multiple prior attempts at weight loss and is now dealing with the side effects, risk factors, and poor quality of life associated with morbid obesity.  They would benefit from surgical weight loss as outlined in the NIH and  ASMBS consensus statements on bariatric surgery.  Surgical intervention is medically indicated.\par \par My impression is that the patient is a reasonable candidate for laparoscopic sleeve gastrectomy, once work-up is completed and after recovery from cholecystectomy.\par

## 2022-02-28 NOTE — PHYSICAL EXAM
[Alert] : alert [Oriented to Person] : oriented to person [Oriented to Place] : oriented to place [Oriented to Time] : oriented to time [de-identified] : Well-appearing, no acute distress [de-identified] : Sclerae anicteric [de-identified] : Normal respirations [de-identified] : Soft, obese, nontender, nondistended.

## 2022-03-07 ENCOUNTER — APPOINTMENT (OUTPATIENT)
Dept: CARDIOLOGY | Facility: CLINIC | Age: 47
End: 2022-03-07
Payer: COMMERCIAL

## 2022-03-07 ENCOUNTER — NON-APPOINTMENT (OUTPATIENT)
Age: 47
End: 2022-03-07

## 2022-03-07 VITALS
SYSTOLIC BLOOD PRESSURE: 108 MMHG | OXYGEN SATURATION: 99 % | WEIGHT: 293 LBS | HEART RATE: 104 BPM | TEMPERATURE: 98.2 F | BODY MASS INDEX: 50.02 KG/M2 | DIASTOLIC BLOOD PRESSURE: 72 MMHG | HEIGHT: 64 IN

## 2022-03-07 DIAGNOSIS — K80.20 CALCULUS OF GALLBLADDER W/OUT CHOLECYSTITIS W/OUT OBSTRUCTION: ICD-10-CM

## 2022-03-07 DIAGNOSIS — R79.89 OTHER SPECIFIED ABNORMAL FINDINGS OF BLOOD CHEMISTRY: ICD-10-CM

## 2022-03-07 PROCEDURE — 99214 OFFICE O/P EST MOD 30 MIN: CPT

## 2022-03-07 PROCEDURE — 93000 ELECTROCARDIOGRAM COMPLETE: CPT

## 2022-03-07 NOTE — HISTORY OF PRESENT ILLNESS
[FreeTextEntry1] : This is a 47 year old female who returns today for follow up visit after being seen in office 2/14/2022. Pt was recently started on Farxiga 10mg daily secondary to patients baseline EF 38-40% on echo. She is tolerating medication well. She also is here for pre-op clearance for Laparoscopic cholecystectomy 3/23/2022 with Dr. Diamond. She is also in process of obtaining her clearances for  laparoscopic sleeve gastrectomy.

## 2022-03-09 ENCOUNTER — NON-APPOINTMENT (OUTPATIENT)
Age: 47
End: 2022-03-09

## 2022-03-15 ENCOUNTER — APPOINTMENT (OUTPATIENT)
Dept: CARDIOLOGY | Facility: CLINIC | Age: 47
End: 2022-03-15

## 2022-03-16 ENCOUNTER — TRANSCRIPTION ENCOUNTER (OUTPATIENT)
Age: 47
End: 2022-03-16

## 2022-03-16 ENCOUNTER — INPATIENT (INPATIENT)
Facility: HOSPITAL | Age: 47
LOS: 1 days | Discharge: HOME | DRG: 418 | End: 2022-03-18
Attending: SURGERY | Admitting: SURGERY
Payer: COMMERCIAL

## 2022-03-16 VITALS
DIASTOLIC BLOOD PRESSURE: 94 MMHG | OXYGEN SATURATION: 96 % | SYSTOLIC BLOOD PRESSURE: 149 MMHG | TEMPERATURE: 98 F | WEIGHT: 293 LBS | HEART RATE: 86 BPM | RESPIRATION RATE: 18 BRPM | HEIGHT: 64 IN

## 2022-03-16 DIAGNOSIS — K81.9 CHOLECYSTITIS, UNSPECIFIED: ICD-10-CM

## 2022-03-16 LAB
ALBUMIN SERPL ELPH-MCNC: 4.2 G/DL — SIGNIFICANT CHANGE UP (ref 3.3–5)
ALP SERPL-CCNC: 127 U/L — HIGH (ref 40–120)
ALT FLD-CCNC: 7 U/L — LOW (ref 10–45)
ANION GAP SERPL CALC-SCNC: 11 MMOL/L — SIGNIFICANT CHANGE UP (ref 5–17)
APTT BLD: 32.8 SEC — SIGNIFICANT CHANGE UP (ref 27.5–35.5)
AST SERPL-CCNC: 10 U/L — SIGNIFICANT CHANGE UP (ref 10–40)
BASE EXCESS BLDV CALC-SCNC: 0.7 MMOL/L — SIGNIFICANT CHANGE UP (ref -2–2)
BASOPHILS # BLD AUTO: 0.03 K/UL — SIGNIFICANT CHANGE UP (ref 0–0.2)
BASOPHILS NFR BLD AUTO: 0.4 % — SIGNIFICANT CHANGE UP (ref 0–2)
BILIRUB SERPL-MCNC: 0.4 MG/DL — SIGNIFICANT CHANGE UP (ref 0.2–1.2)
BLD GP AB SCN SERPL QL: NEGATIVE — SIGNIFICANT CHANGE UP
BUN SERPL-MCNC: 13 MG/DL — SIGNIFICANT CHANGE UP (ref 7–23)
CA-I SERPL-SCNC: 1.23 MMOL/L — SIGNIFICANT CHANGE UP (ref 1.15–1.33)
CALCIUM SERPL-MCNC: 9.7 MG/DL — SIGNIFICANT CHANGE UP (ref 8.4–10.5)
CHLORIDE BLDV-SCNC: 103 MMOL/L — SIGNIFICANT CHANGE UP (ref 96–108)
CHLORIDE SERPL-SCNC: 103 MMOL/L — SIGNIFICANT CHANGE UP (ref 96–108)
CO2 BLDV-SCNC: 28 MMOL/L — HIGH (ref 22–26)
CO2 SERPL-SCNC: 25 MMOL/L — SIGNIFICANT CHANGE UP (ref 22–31)
CREAT SERPL-MCNC: 1.55 MG/DL — HIGH (ref 0.5–1.3)
EGFR: 41 ML/MIN/1.73M2 — LOW
EOSINOPHIL # BLD AUTO: 0.24 K/UL — SIGNIFICANT CHANGE UP (ref 0–0.5)
EOSINOPHIL NFR BLD AUTO: 3 % — SIGNIFICANT CHANGE UP (ref 0–6)
GAS PNL BLDV: 138 MMOL/L — SIGNIFICANT CHANGE UP (ref 136–145)
GAS PNL BLDV: SIGNIFICANT CHANGE UP
GLUCOSE BLDV-MCNC: 123 MG/DL — HIGH (ref 70–99)
GLUCOSE SERPL-MCNC: 136 MG/DL — HIGH (ref 70–99)
HCG SERPL-ACNC: <2 MIU/ML — SIGNIFICANT CHANGE UP
HCO3 BLDV-SCNC: 27 MMOL/L — SIGNIFICANT CHANGE UP (ref 22–29)
HCT VFR BLD CALC: 35.1 % — SIGNIFICANT CHANGE UP (ref 34.5–45)
HCT VFR BLDA CALC: 32 % — LOW (ref 34.5–46.5)
HGB BLD CALC-MCNC: 10.8 G/DL — LOW (ref 11.7–16.1)
HGB BLD-MCNC: 10.7 G/DL — LOW (ref 11.5–15.5)
IMM GRANULOCYTES NFR BLD AUTO: 0.2 % — SIGNIFICANT CHANGE UP (ref 0–1.5)
INR BLD: 1.12 RATIO — SIGNIFICANT CHANGE UP (ref 0.88–1.16)
LACTATE BLDV-MCNC: 1.7 MMOL/L — SIGNIFICANT CHANGE UP (ref 0.7–2)
LYMPHOCYTES # BLD AUTO: 2.61 K/UL — SIGNIFICANT CHANGE UP (ref 1–3.3)
LYMPHOCYTES # BLD AUTO: 32.4 % — SIGNIFICANT CHANGE UP (ref 13–44)
MCHC RBC-ENTMCNC: 24.2 PG — LOW (ref 27–34)
MCHC RBC-ENTMCNC: 30.5 GM/DL — LOW (ref 32–36)
MCV RBC AUTO: 79.4 FL — LOW (ref 80–100)
MONOCYTES # BLD AUTO: 0.49 K/UL — SIGNIFICANT CHANGE UP (ref 0–0.9)
MONOCYTES NFR BLD AUTO: 6.1 % — SIGNIFICANT CHANGE UP (ref 2–14)
NEUTROPHILS # BLD AUTO: 4.67 K/UL — SIGNIFICANT CHANGE UP (ref 1.8–7.4)
NEUTROPHILS NFR BLD AUTO: 57.9 % — SIGNIFICANT CHANGE UP (ref 43–77)
NRBC # BLD: 0 /100 WBCS — SIGNIFICANT CHANGE UP (ref 0–0)
PCO2 BLDV: 50 MMHG — HIGH (ref 39–42)
PH BLDV: 7.34 — SIGNIFICANT CHANGE UP (ref 7.32–7.43)
PLATELET # BLD AUTO: 325 K/UL — SIGNIFICANT CHANGE UP (ref 150–400)
PO2 BLDV: 38 MMHG — SIGNIFICANT CHANGE UP (ref 25–45)
POTASSIUM BLDV-SCNC: 3.9 MMOL/L — SIGNIFICANT CHANGE UP (ref 3.5–5.1)
POTASSIUM SERPL-MCNC: 3.6 MMOL/L — SIGNIFICANT CHANGE UP (ref 3.5–5.3)
POTASSIUM SERPL-SCNC: 3.6 MMOL/L — SIGNIFICANT CHANGE UP (ref 3.5–5.3)
PROT SERPL-MCNC: 7.7 G/DL — SIGNIFICANT CHANGE UP (ref 6–8.3)
PROTHROM AB SERPL-ACNC: 13 SEC — SIGNIFICANT CHANGE UP (ref 10.5–13.4)
RBC # BLD: 4.42 M/UL — SIGNIFICANT CHANGE UP (ref 3.8–5.2)
RBC # FLD: 16.3 % — HIGH (ref 10.3–14.5)
RH IG SCN BLD-IMP: POSITIVE — SIGNIFICANT CHANGE UP
SAO2 % BLDV: 63.8 % — LOW (ref 67–88)
SARS-COV-2 RNA SPEC QL NAA+PROBE: SIGNIFICANT CHANGE UP
SODIUM SERPL-SCNC: 139 MMOL/L — SIGNIFICANT CHANGE UP (ref 135–145)
WBC # BLD: 8.06 K/UL — SIGNIFICANT CHANGE UP (ref 3.8–10.5)
WBC # FLD AUTO: 8.06 K/UL — SIGNIFICANT CHANGE UP (ref 3.8–10.5)

## 2022-03-16 PROCEDURE — 78227 HEPATOBIL SYST IMAGE W/DRUG: CPT | Mod: 26

## 2022-03-16 PROCEDURE — 76700 US EXAM ABDOM COMPLETE: CPT | Mod: 26

## 2022-03-16 PROCEDURE — 99284 EMERGENCY DEPT VISIT MOD MDM: CPT

## 2022-03-16 RX ORDER — ONDANSETRON 8 MG/1
4 TABLET, FILM COATED ORAL ONCE
Refills: 0 | Status: COMPLETED | OUTPATIENT
Start: 2022-03-16 | End: 2022-03-16

## 2022-03-16 RX ORDER — MORPHINE SULFATE 50 MG/1
4 CAPSULE, EXTENDED RELEASE ORAL ONCE
Refills: 0 | Status: DISCONTINUED | OUTPATIENT
Start: 2022-03-16 | End: 2022-03-16

## 2022-03-16 RX ORDER — OXYCODONE HYDROCHLORIDE 5 MG/1
5 TABLET ORAL EVERY 4 HOURS
Refills: 0 | Status: DISCONTINUED | OUTPATIENT
Start: 2022-03-16 | End: 2022-03-17

## 2022-03-16 RX ORDER — FENTANYL CITRATE 50 UG/ML
50 INJECTION INTRAVENOUS ONCE
Refills: 0 | Status: DISCONTINUED | OUTPATIENT
Start: 2022-03-16 | End: 2022-03-16

## 2022-03-16 RX ORDER — SODIUM CHLORIDE 9 MG/ML
1000 INJECTION INTRAMUSCULAR; INTRAVENOUS; SUBCUTANEOUS ONCE
Refills: 0 | Status: COMPLETED | OUTPATIENT
Start: 2022-03-16 | End: 2022-03-16

## 2022-03-16 RX ORDER — PANTOPRAZOLE SODIUM 20 MG/1
40 TABLET, DELAYED RELEASE ORAL
Refills: 0 | Status: DISCONTINUED | OUTPATIENT
Start: 2022-03-16 | End: 2022-03-17

## 2022-03-16 RX ORDER — FUROSEMIDE 40 MG
1 TABLET ORAL
Qty: 0 | Refills: 0 | DISCHARGE

## 2022-03-16 RX ORDER — ACETAMINOPHEN 500 MG
650 TABLET ORAL EVERY 6 HOURS
Refills: 0 | Status: DISCONTINUED | OUTPATIENT
Start: 2022-03-16 | End: 2022-03-17

## 2022-03-16 RX ORDER — DEXTROSE MONOHYDRATE, SODIUM CHLORIDE, AND POTASSIUM CHLORIDE 50; .745; 4.5 G/1000ML; G/1000ML; G/1000ML
1000 INJECTION, SOLUTION INTRAVENOUS
Refills: 0 | Status: DISCONTINUED | OUTPATIENT
Start: 2022-03-16 | End: 2022-03-17

## 2022-03-16 RX ORDER — OXYCODONE HYDROCHLORIDE 5 MG/1
10 TABLET ORAL EVERY 4 HOURS
Refills: 0 | Status: DISCONTINUED | OUTPATIENT
Start: 2022-03-16 | End: 2022-03-17

## 2022-03-16 RX ORDER — METOPROLOL TARTRATE 50 MG
1 TABLET ORAL
Qty: 0 | Refills: 0 | DISCHARGE

## 2022-03-16 RX ORDER — ACETAMINOPHEN 500 MG
1000 TABLET ORAL ONCE
Refills: 0 | Status: COMPLETED | OUTPATIENT
Start: 2022-03-16 | End: 2022-03-16

## 2022-03-16 RX ORDER — HEPARIN SODIUM 5000 [USP'U]/ML
5000 INJECTION INTRAVENOUS; SUBCUTANEOUS EVERY 8 HOURS
Refills: 0 | Status: DISCONTINUED | OUTPATIENT
Start: 2022-03-16 | End: 2022-03-17

## 2022-03-16 RX ADMIN — PANTOPRAZOLE SODIUM 40 MILLIGRAM(S): 20 TABLET, DELAYED RELEASE ORAL at 22:20

## 2022-03-16 RX ADMIN — DEXTROSE MONOHYDRATE, SODIUM CHLORIDE, AND POTASSIUM CHLORIDE 100 MILLILITER(S): 50; .745; 4.5 INJECTION, SOLUTION INTRAVENOUS at 22:20

## 2022-03-16 RX ADMIN — HEPARIN SODIUM 5000 UNIT(S): 5000 INJECTION INTRAVENOUS; SUBCUTANEOUS at 22:20

## 2022-03-16 RX ADMIN — HEPARIN SODIUM 5000 UNIT(S): 5000 INJECTION INTRAVENOUS; SUBCUTANEOUS at 14:36

## 2022-03-16 RX ADMIN — FENTANYL CITRATE 50 MICROGRAM(S): 50 INJECTION INTRAVENOUS at 08:14

## 2022-03-16 RX ADMIN — DEXTROSE MONOHYDRATE, SODIUM CHLORIDE, AND POTASSIUM CHLORIDE 100 MILLILITER(S): 50; .745; 4.5 INJECTION, SOLUTION INTRAVENOUS at 19:56

## 2022-03-16 RX ADMIN — MORPHINE SULFATE 4 MILLIGRAM(S): 50 CAPSULE, EXTENDED RELEASE ORAL at 06:48

## 2022-03-16 RX ADMIN — SODIUM CHLORIDE 1000 MILLILITER(S): 9 INJECTION INTRAMUSCULAR; INTRAVENOUS; SUBCUTANEOUS at 08:14

## 2022-03-16 RX ADMIN — Medication 400 MILLIGRAM(S): at 16:25

## 2022-03-16 RX ADMIN — FENTANYL CITRATE 50 MICROGRAM(S): 50 INJECTION INTRAVENOUS at 08:53

## 2022-03-16 RX ADMIN — ONDANSETRON 4 MILLIGRAM(S): 8 TABLET, FILM COATED ORAL at 06:48

## 2022-03-16 RX ADMIN — MORPHINE SULFATE 4 MILLIGRAM(S): 50 CAPSULE, EXTENDED RELEASE ORAL at 08:10

## 2022-03-16 RX ADMIN — Medication 1000 MILLIGRAM(S): at 17:11

## 2022-03-16 NOTE — H&P ADULT - NSHPLABSRESULTS_GEN_ALL_CORE
----------  LABORATORY DATA:  ----------                        10.7   8.06  )-----------( 325      ( 16 Mar 2022 06:58 )             35.1               03-16    139  |  103  |  13  ----------------------------<  136<H>  3.6   |  25  |  1.55<H>    Ca    9.7      16 Mar 2022 06:58    TPro  7.7  /  Alb  4.2  /  TBili  0.4  /  DBili  x   /  AST  10  /  ALT  7<L>  /  AlkPhos  127<H>  03-16    LIVER FUNCTIONS - ( 16 Mar 2022 06:58 )  Alb: 4.2 g/dL / Pro: 7.7 g/dL / ALK PHOS: 127 U/L / ALT: 7 U/L / AST: 10 U/L / GGT: x           PT/INR - ( 16 Mar 2022 06:58 )   PT: 13.0 sec;   INR: 1.12 ratio         PTT - ( 16 Mar 2022 06:58 )  PTT:32.8 sec    ----------  RADIOGRAPHIC DATA:  ---------  < from: US Abdomen Complete (US Abdomen Complete .) (03.16.22 @ 07:52) >    FINDINGS:    Liver: Mildly enlarged, measuring 16 cm in length. Limited evaluation of   the hepatic parenchyma. However, the liver parenchyma appears diffusely   echogenic.  Bile ducts: Common bile duct is mildly dilated, measuring 0.7 cm.  Gallbladder: Cholelithiasis, with a nonmobile stone in the neck of the   gallbladder.. Normal gallbladder wall thickness  Pancreas: Limited evaluation.  Spleen: 10.0 cm. Within normal limits.  Right kidney: 10.0 cm. No hydronephrosis.  Left kidney: 9.8 cm.  No hydronephrosis.  Ascites: None.  Aorta and IVC: Visualized portions are within normal limits.    IMPRESSION:    Technically limited study.    Mild hepatomegaly with diffuse hepatic steatosis. Limited evaluation of  the liver parenchyma.    Cholelithiasis, including a nonmobile stone in the neck of the   gallbladder. HIDA scan is suggested to assess for acute cholecystitis.    Mild dilatation of the common bile duct to 0.7 cm.    Limited evaluation of the pancreas and midline structures.        < from: NM Hepatobiliary Imaging w/ RX (03.16.22 @ 12:13) >    FINDINGS: There is prompt, homogeneous uptake of radiotracer by the   hepatocytes. Activity is first seen in the bowel at 30 minutes. The   gallbladder is not visualized at any time during the study, despite   administration of morphine. There is good clearance of activity from the   liver at the end of the study.    IMPRESSION: Abnormal morphine-augmented hepatobiliary scan.

## 2022-03-16 NOTE — H&P ADULT - HISTORY OF PRESENT ILLNESS
47F with PMHx morbid obesity, hypertension, renal insufficiency, CHF with EF 38 to 40%, obstructive sleep apnea, gallstones presenting with acute onset RUQ pain beginning early this morning. Patient reports this pain is similar to the biliary colic she experiences in setting of known history of gallstones. Reports some associated nausea. Denies fever/chills, vomiting, diarrhea/constipation. Last had PO intake yesterday evening, having normal GI function. Pain controlled after receiving morphine in ED. Follows with Dr. Diamond, currently scheduled for elective laparoscopic cholecystectomy on 3/23/22, underwent pre-operative clearance with Dr. Crawford on 3/7/22.      47F with PMHx morbid obesity, hypertension, renal insufficiency, CHF with EF 38 to 40%, obstructive sleep apnea, gallstones presenting with acute onset RUQ pain beginning early this morning. Patient reports this pain is similar to the biliary colic she experiences in setting of known history of gallstones. Reports some associated nausea. Denies fever/chills, vomiting, diarrhea/constipation. Last had PO intake yesterday evening, having normal GI function. Pain controlled after receiving morphine in ED. Follows with Dr. Diamond, currently scheduled for elective laparoscopic cholecystectomy on 3/23/22, underwent pre-operative clearance with Dr. Crawford on 3/7/22.     In ED, patient hemodynamically stable, afebrile, WBC 8, SCr 1.5 (1.25 from 2020), Tbili 0.4, , RUQ sono showing cholelithiasis and a nonmobile stone in the neck of the gallbladder.

## 2022-03-16 NOTE — CHART NOTE - NSCHARTNOTEFT_GEN_A_CORE
Patient could not tolerate MRCP. She could barely fit in the MRI machine and became claustrophobic. Her blood pressure is 102/56 and we decided, in conjunction with senior resident, that it would be ill-advised to provide her with an anti anxiolytic such as ativan. She will likely need some type of sedation in order to tolerate MRI in the future.

## 2022-03-16 NOTE — ED PROVIDER NOTE - OBJECTIVE STATEMENT
46 y/o F w/ PMH of CHF, gallstones, p/w RUQ abd pain that started 3 hours ago. Patient woke up, and began to feel RUQ pain and epigastric pain, bringing her to ED. Patient endorses Nausea. Patient is scheduled for Cholecystectomy w/ Dr. Diamond on 3/23. Patient denies any vomiting, CP, fever, chills, bleeding or other ROS.

## 2022-03-16 NOTE — ED ADULT NURSE REASSESSMENT NOTE - NS ED NURSE REASSESS COMMENT FT1
Report received from change of Night RN Will. Patient resting comfortably. Comfort and safety provided. VSS.

## 2022-03-16 NOTE — H&P ADULT - ASSESSMENT
47F with PMHx hypertension, renal insufficiency, CHF with EF 38 to 40%, obstructive sleep apnea, gallstones presenting with acute onset RUQ pain beginning early this morning in setting of known history of gallstones  47F with PMHx morbid obesity, hypertension, renal insufficiency, CHF with EF 38 to 40%, obstructive sleep apnea, gallstones presenting with acute onset RUQ pain consistent with symptomatic cholelithiasis.    Plan:  - Admit to [facility], Dr. Diamond  - Pre-op and consent for laparoscopic cholecystectomy tomorrow, 03/17  - MRCP for elevated ALP  - Regular diet, NPO at midnight  - Pain control PRN  - Holding home anti-HTN meds in setting of well-controlled BPs  - DVT ppx/OOB/IS    D/w Dr. Dara Vasquez [facility]  [id number]

## 2022-03-16 NOTE — ED PROVIDER NOTE - NSICDXFAMILYHX_GEN_ALL_CORE_FT
FAMILY HISTORY:  Father  Still living? No  Family history of cerebrovascular accident (CVA), Age at diagnosis: Age Unknown    Mother  Still living? No  Family history of lung cancer, Age at diagnosis: Age Unknown

## 2022-03-16 NOTE — H&P ADULT - NSHPSOCIALHISTORY_GEN_ALL_CORE
She was a smoker until just recently, and is committed to quitting  No illicit drug use  Occasional alcohol use

## 2022-03-16 NOTE — ED ADULT TRIAGE NOTE - CHIEF COMPLAINT QUOTE
Pt reports pain underneath right breast radiating to epigastric area, beginning around 4am. Slightly nauseous and denies vomiting or SOB. Hx known kidney stones, scheduled for surgery next week.

## 2022-03-16 NOTE — ED PROVIDER NOTE - NSICDXPASTMEDICALHX_GEN_ALL_CORE_FT
PAST MEDICAL HISTORY:  CHF (congestive heart failure), NYHA class II, chronic, systolic     Class 3 severe obesity with body mass index (BMI) greater than or equal to 70 in adult, unspecified obesity type, unspecified whether serious comorbidity present     Essential hypertension     Smoker

## 2022-03-16 NOTE — ED ADULT NURSE NOTE - OBJECTIVE STATEMENT
Patient is a 47 year old female complaining of abdominal pain. Patient has history of gall stones, chf, htn. Patient is A&O x 4. Pt reports waking up at 4 am with epigastric pain and pain under her right breast. Endorsing complaints of nausea. pt states she has known gall stones and is schedule to have surgery next week. Denies complaints of chest pain, sob, fevers, chills. Safety and comfort maintained. Will continue to monitor.

## 2022-03-16 NOTE — H&P ADULT - NSHPPHYSICALEXAM_GEN_ALL_CORE
VITALS:   T(C): 37.1 (03-16-22 @ 16:19), Max: 37.1 (03-16-22 @ 16:19)  HR: 86 (03-16-22 @ 16:19) (70 - 86)  BP: 128/76 (03-16-22 @ 16:19) (98/68 - 149/94)  RR: 18 (03-16-22 @ 16:19) (16 - 18)  SpO2: 100% (03-16-22 @ 16:19) (95% - 100%)    GENERAL: NAD, lying in bed comfortably  HEART: Regular rate and rhythm  LUNGS: Unlabored respirations  ABDOMEN: Soft, obese, RUQ TTP to deep palpation   EXTREMITIES: 2+ peripheral pulses bilaterally  NERVOUS SYSTEM:  A&Ox3, no focal deficits

## 2022-03-16 NOTE — ED PROVIDER NOTE - ATTENDING CONTRIBUTION TO CARE
Patient with history of gallstones planning for elective cholecystectomy with Dr Diamond in approximately 1.5 weeks presenting with sudden onset RUQ pain and nausea awakening her from sleep.  Point tender in RUQ on exam.  Suspect biliary etiology of presentation - plan for labs, RUQ US, pain/symptom management, surgical consultation.

## 2022-03-16 NOTE — ED PROVIDER NOTE - CLINICAL SUMMARY MEDICAL DECISION MAKING FREE TEXT BOX
48 y/o F w/ PMH of CHF, gallstones, p/w RUQ abd pain that started 3 hours ago. Will get CBC, CMP, coags, RUQ US, pain control w/ morphine and reassess. May consider Surgery C/s after labwork

## 2022-03-17 ENCOUNTER — APPOINTMENT (OUTPATIENT)
Dept: SURGERY | Facility: HOSPITAL | Age: 47
End: 2022-03-17
Payer: COMMERCIAL

## 2022-03-17 ENCOUNTER — RESULT REVIEW (OUTPATIENT)
Age: 47
End: 2022-03-17

## 2022-03-17 LAB
ALBUMIN SERPL ELPH-MCNC: 3.8 G/DL — SIGNIFICANT CHANGE UP (ref 3.3–5)
ALP SERPL-CCNC: 120 U/L — SIGNIFICANT CHANGE UP (ref 40–120)
ALT FLD-CCNC: 7 U/L — LOW (ref 10–45)
ANION GAP SERPL CALC-SCNC: 9 MMOL/L — SIGNIFICANT CHANGE UP (ref 5–17)
AST SERPL-CCNC: 11 U/L — SIGNIFICANT CHANGE UP (ref 10–40)
BILIRUB SERPL-MCNC: 0.6 MG/DL — SIGNIFICANT CHANGE UP (ref 0.2–1.2)
BLD GP AB SCN SERPL QL: NEGATIVE — SIGNIFICANT CHANGE UP
BUN SERPL-MCNC: 10 MG/DL — SIGNIFICANT CHANGE UP (ref 7–23)
CALCIUM SERPL-MCNC: 9.1 MG/DL — SIGNIFICANT CHANGE UP (ref 8.4–10.5)
CHLORIDE SERPL-SCNC: 104 MMOL/L — SIGNIFICANT CHANGE UP (ref 96–108)
CO2 SERPL-SCNC: 26 MMOL/L — SIGNIFICANT CHANGE UP (ref 22–31)
CREAT SERPL-MCNC: 1.51 MG/DL — HIGH (ref 0.5–1.3)
EGFR: 42 ML/MIN/1.73M2 — LOW
GLUCOSE SERPL-MCNC: 108 MG/DL — HIGH (ref 70–99)
HCT VFR BLD CALC: 33.8 % — LOW (ref 34.5–45)
HGB BLD-MCNC: 10 G/DL — LOW (ref 11.5–15.5)
LIDOCAIN IGE QN: 11 U/L — SIGNIFICANT CHANGE UP (ref 7–60)
MAGNESIUM SERPL-MCNC: 2.2 MG/DL — SIGNIFICANT CHANGE UP (ref 1.6–2.6)
MCHC RBC-ENTMCNC: 24.1 PG — LOW (ref 27–34)
MCHC RBC-ENTMCNC: 29.6 GM/DL — LOW (ref 32–36)
MCV RBC AUTO: 81.4 FL — SIGNIFICANT CHANGE UP (ref 80–100)
NRBC # BLD: 0 /100 WBCS — SIGNIFICANT CHANGE UP (ref 0–0)
PHOSPHATE SERPL-MCNC: 3.3 MG/DL — SIGNIFICANT CHANGE UP (ref 2.5–4.5)
PLATELET # BLD AUTO: 300 K/UL — SIGNIFICANT CHANGE UP (ref 150–400)
POTASSIUM SERPL-MCNC: 4.1 MMOL/L — SIGNIFICANT CHANGE UP (ref 3.5–5.3)
POTASSIUM SERPL-SCNC: 4.1 MMOL/L — SIGNIFICANT CHANGE UP (ref 3.5–5.3)
PROT SERPL-MCNC: 7.1 G/DL — SIGNIFICANT CHANGE UP (ref 6–8.3)
RBC # BLD: 4.15 M/UL — SIGNIFICANT CHANGE UP (ref 3.8–5.2)
RBC # FLD: 16.6 % — HIGH (ref 10.3–14.5)
RH IG SCN BLD-IMP: POSITIVE — SIGNIFICANT CHANGE UP
SODIUM SERPL-SCNC: 139 MMOL/L — SIGNIFICANT CHANGE UP (ref 135–145)
WBC # BLD: 6.1 K/UL — SIGNIFICANT CHANGE UP (ref 3.8–10.5)
WBC # FLD AUTO: 6.1 K/UL — SIGNIFICANT CHANGE UP (ref 3.8–10.5)

## 2022-03-17 PROCEDURE — 93010 ELECTROCARDIOGRAM REPORT: CPT

## 2022-03-17 PROCEDURE — 47562 LAPAROSCOPIC CHOLECYSTECTOMY: CPT

## 2022-03-17 PROCEDURE — 99223 1ST HOSP IP/OBS HIGH 75: CPT | Mod: 57

## 2022-03-17 PROCEDURE — 88304 TISSUE EXAM BY PATHOLOGIST: CPT | Mod: 26

## 2022-03-17 DEVICE — CLIP APPLIER COVIDIEN ENDOCLIP III 5MM: Type: IMPLANTABLE DEVICE | Site: ABDOMINAL | Status: FUNCTIONAL

## 2022-03-17 DEVICE — KIT A-LINE 1LUM 20G X 12CM SAFE KIT: Type: IMPLANTABLE DEVICE | Site: ABDOMINAL | Status: FUNCTIONAL

## 2022-03-17 DEVICE — URETERAL CATH OPEN END 5FR 70CM: Type: IMPLANTABLE DEVICE | Site: ABDOMINAL | Status: FUNCTIONAL

## 2022-03-17 RX ORDER — ONDANSETRON 8 MG/1
4 TABLET, FILM COATED ORAL EVERY 4 HOURS
Refills: 0 | Status: DISCONTINUED | OUTPATIENT
Start: 2022-03-17 | End: 2022-03-17

## 2022-03-17 RX ORDER — ACETAMINOPHEN 500 MG
650 TABLET ORAL EVERY 6 HOURS
Refills: 0 | Status: DISCONTINUED | OUTPATIENT
Start: 2022-03-17 | End: 2022-03-18

## 2022-03-17 RX ORDER — HEPARIN SODIUM 5000 [USP'U]/ML
5000 INJECTION INTRAVENOUS; SUBCUTANEOUS EVERY 8 HOURS
Refills: 0 | Status: DISCONTINUED | OUTPATIENT
Start: 2022-03-17 | End: 2022-03-18

## 2022-03-17 RX ORDER — FENTANYL CITRATE 50 UG/ML
25 INJECTION INTRAVENOUS
Refills: 0 | Status: DISCONTINUED | OUTPATIENT
Start: 2022-03-17 | End: 2022-03-17

## 2022-03-17 RX ORDER — INFLUENZA VIRUS VACCINE 15; 15; 15; 15 UG/.5ML; UG/.5ML; UG/.5ML; UG/.5ML
0.5 SUSPENSION INTRAMUSCULAR ONCE
Refills: 0 | Status: DISCONTINUED | OUTPATIENT
Start: 2022-03-17 | End: 2022-03-18

## 2022-03-17 RX ORDER — SODIUM CHLORIDE 9 MG/ML
1000 INJECTION, SOLUTION INTRAVENOUS
Refills: 0 | Status: DISCONTINUED | OUTPATIENT
Start: 2022-03-17 | End: 2022-03-17

## 2022-03-17 RX ORDER — OXYCODONE HYDROCHLORIDE 5 MG/1
5 TABLET ORAL EVERY 6 HOURS
Refills: 0 | Status: DISCONTINUED | OUTPATIENT
Start: 2022-03-17 | End: 2022-03-18

## 2022-03-17 RX ORDER — METOPROLOL TARTRATE 50 MG
100 TABLET ORAL DAILY
Refills: 0 | Status: DISCONTINUED | OUTPATIENT
Start: 2022-03-17 | End: 2022-03-17

## 2022-03-17 RX ORDER — FENTANYL CITRATE 50 UG/ML
50 INJECTION INTRAVENOUS
Refills: 0 | Status: DISCONTINUED | OUTPATIENT
Start: 2022-03-17 | End: 2022-03-17

## 2022-03-17 RX ORDER — METOPROLOL TARTRATE 50 MG
100 TABLET ORAL DAILY
Refills: 0 | Status: DISCONTINUED | OUTPATIENT
Start: 2022-03-17 | End: 2022-03-18

## 2022-03-17 RX ORDER — OXYCODONE HYDROCHLORIDE 5 MG/1
10 TABLET ORAL EVERY 6 HOURS
Refills: 0 | Status: DISCONTINUED | OUTPATIENT
Start: 2022-03-17 | End: 2022-03-18

## 2022-03-17 RX ORDER — PANTOPRAZOLE SODIUM 20 MG/1
40 TABLET, DELAYED RELEASE ORAL
Refills: 0 | Status: DISCONTINUED | OUTPATIENT
Start: 2022-03-17 | End: 2022-03-18

## 2022-03-17 RX ADMIN — OXYCODONE HYDROCHLORIDE 10 MILLIGRAM(S): 5 TABLET ORAL at 10:37

## 2022-03-17 RX ADMIN — FENTANYL CITRATE 50 MICROGRAM(S): 50 INJECTION INTRAVENOUS at 10:17

## 2022-03-17 RX ADMIN — FENTANYL CITRATE 50 MICROGRAM(S): 50 INJECTION INTRAVENOUS at 10:06

## 2022-03-17 RX ADMIN — HEPARIN SODIUM 5000 UNIT(S): 5000 INJECTION INTRAVENOUS; SUBCUTANEOUS at 13:12

## 2022-03-17 RX ADMIN — Medication 650 MILLIGRAM(S): at 17:44

## 2022-03-17 RX ADMIN — PANTOPRAZOLE SODIUM 40 MILLIGRAM(S): 20 TABLET, DELAYED RELEASE ORAL at 06:29

## 2022-03-17 RX ADMIN — OXYCODONE HYDROCHLORIDE 10 MILLIGRAM(S): 5 TABLET ORAL at 21:44

## 2022-03-17 RX ADMIN — Medication 650 MILLIGRAM(S): at 17:19

## 2022-03-17 RX ADMIN — Medication 100 MILLIGRAM(S): at 06:29

## 2022-03-17 RX ADMIN — OXYCODONE HYDROCHLORIDE 10 MILLIGRAM(S): 5 TABLET ORAL at 22:14

## 2022-03-17 RX ADMIN — HEPARIN SODIUM 5000 UNIT(S): 5000 INJECTION INTRAVENOUS; SUBCUTANEOUS at 06:29

## 2022-03-17 RX ADMIN — HEPARIN SODIUM 5000 UNIT(S): 5000 INJECTION INTRAVENOUS; SUBCUTANEOUS at 21:40

## 2022-03-17 NOTE — PATIENT PROFILE ADULT - FALL HARM RISK - UNIVERSAL INTERVENTIONS
Bed in lowest position, wheels locked, appropriate side rails in place/Call bell, personal items and telephone in reach/Instruct patient to call for assistance before getting out of bed or chair/Non-slip footwear when patient is out of bed/[place] to call system/Physically safe environment - no spills, clutter or unnecessary equipment/Purposeful Proactive Rounding/Room/bathroom lighting operational, light cord in reach

## 2022-03-17 NOTE — BRIEF OPERATIVE NOTE - OPERATION/FINDINGS
Lap rocco for acute cholecystitis. No intra-operative cholangiogram was performed. Critical view of safety was achieved and surgical clips were applied to cystic duct and cystic artery.

## 2022-03-17 NOTE — PROGRESS NOTE ADULT - SUBJECTIVE AND OBJECTIVE BOX
TEAM Surgery Progress Note    INTERVAL EVENTS: Patient could not tolerate MRCP due to size and claustrophobia.   SUBJECTIVE: Patient seen and examined at bedside with surgical team        OBJECTIVE:    Vital Signs Last 24 Hrs  T(C): 36.6 (17 Mar 2022 00:31), Max: 37.1 (16 Mar 2022 16:19)  T(F): 97.9 (17 Mar 2022 00:31), Max: 98.8 (16 Mar 2022 16:19)  HR: 79 (17 Mar 2022 00:31) (68 - 86)  BP: 138/78 (17 Mar 2022 00:31) (93/46 - 149/94)  BP(mean): --  RR: 18 (17 Mar 2022 00:31) (16 - 18)  SpO2: 97% (17 Mar 2022 00:31) (95% - 100%)I&O's Detail    16 Mar 2022 07:01  -  17 Mar 2022 02:28  --------------------------------------------------------  IN:  Total IN: 0 mL    OUT:    Oral Fluid: 0 mL    Voided (mL): 0 mL  Total OUT: 0 mL    Total NET: 0 mL      MEDICATIONS  (STANDING):  dextrose 5% + sodium chloride 0.45% with potassium chloride 20 mEq/L 1000 milliLiter(s) (100 mL/Hr) IV Continuous <Continuous>  heparin   Injectable 5000 Unit(s) SubCutaneous every 8 hours  pantoprazole    Tablet 40 milliGRAM(s) Oral before breakfast    MEDICATIONS  (PRN):  acetaminophen     Tablet .. 650 milliGRAM(s) Oral every 6 hours PRN Mild Pain (1 - 3)  oxyCODONE    IR 5 milliGRAM(s) Oral every 4 hours PRN Moderate Pain (4 - 6)  oxyCODONE    IR 10 milliGRAM(s) Oral every 4 hours PRN Severe Pain (7 - 10)      GENERAL: NAD, lying in bed comfortably  HEART: Regular rate and rhythm  LUNGS: Unlabored respirations  ABDOMEN: Soft, obese, RUQ TTP to deep palpation   EXTREMITIES: 2+ peripheral pulses bilaterally  NERVOUS SYSTEM:  A&Ox3, no focal deficits    LABS:                        10.7   8.06  )-----------( 325      ( 16 Mar 2022 06:58 )             35.1     03-16    139  |  103  |  13  ----------------------------<  136<H>  3.6   |  25  |  1.55<H>    Ca    9.7      16 Mar 2022 06:58    TPro  7.7  /  Alb  4.2  /  TBili  0.4  /  DBili  x   /  AST  10  /  ALT  7<L>  /  AlkPhos  127<H>  03-16    PT/INR - ( 16 Mar 2022 06:58 )   PT: 13.0 sec;   INR: 1.12 ratio         PTT - ( 16 Mar 2022 06:58 )  PTT:32.8 sec  LIVER FUNCTIONS - ( 16 Mar 2022 06:58 )  Alb: 4.2 g/dL / Pro: 7.7 g/dL / ALK PHOS: 127 U/L / ALT: 7 U/L / AST: 10 U/L / GGT: x             ABO Interpretation: O (03-16-22 @ 07:23)      IMAGING:     TEAM Surgery Progress Note    INTERVAL EVENTS: Patient could not tolerate MRCP due to size and claustrophobia.   SUBJECTIVE: Patient seen and examined at bedside with surgical team. No complaints of abdominal pain or nausea at this time.    OBJECTIVE:    Vital Signs Last 24 Hrs  T(C): 36.6 (17 Mar 2022 00:31), Max: 37.1 (16 Mar 2022 16:19)  T(F): 97.9 (17 Mar 2022 00:31), Max: 98.8 (16 Mar 2022 16:19)  HR: 79 (17 Mar 2022 00:31) (68 - 86)  BP: 138/78 (17 Mar 2022 00:31) (93/46 - 149/94)  BP(mean): --  RR: 18 (17 Mar 2022 00:31) (16 - 18)  SpO2: 97% (17 Mar 2022 00:31) (95% - 100%)I&O's Detail    16 Mar 2022 07:01  -  17 Mar 2022 02:28  --------------------------------------------------------  IN:  Total IN: 0 mL    OUT:    Oral Fluid: 0 mL    Voided (mL): 0 mL  Total OUT: 0 mL    Total NET: 0 mL    MEDICATIONS  (STANDING):  dextrose 5% + sodium chloride 0.45% with potassium chloride 20 mEq/L 1000 milliLiter(s) (100 mL/Hr) IV Continuous <Continuous>  heparin   Injectable 5000 Unit(s) SubCutaneous every 8 hours  pantoprazole    Tablet 40 milliGRAM(s) Oral before breakfast    MEDICATIONS  (PRN):  acetaminophen     Tablet .. 650 milliGRAM(s) Oral every 6 hours PRN Mild Pain (1 - 3)  oxyCODONE    IR 5 milliGRAM(s) Oral every 4 hours PRN Moderate Pain (4 - 6)  oxyCODONE    IR 10 milliGRAM(s) Oral every 4 hours PRN Severe Pain (7 - 10)    Physical Exam:  GENERAL: NAD, lying in bed comfortably  HEART: Regular rate  LUNGS: Unlabored respirations  ABDOMEN: Soft, obese, RUQ TTP to deep palpation   EXTREMITIES: 2+ peripheral pulses bilaterally  NERVOUS SYSTEM:  A&Ox3, no focal deficits    LABS:             10.7   8.06  )-----------( 325      ( 16 Mar 2022 06:58 )             35.1     03-16    139  |  103  |  13  ----------------------------<  136<H>  3.6   |  25  |  1.55<H>    Ca    9.7      16 Mar 2022 06:58    TPro  7.7  /  Alb  4.2  /  TBili  0.4  /  DBili  x   /  AST  10  /  ALT  7<L>  /  AlkPhos  127<H>  03-16    PT/INR - ( 16 Mar 2022 06:58 )   PT: 13.0 sec;   INR: 1.12 ratio      PTT - ( 16 Mar 2022 06:58 )  PTT:32.8 sec  LIVER FUNCTIONS - ( 16 Mar 2022 06:58 )  Alb: 4.2 g/dL / Pro: 7.7 g/dL / ALK PHOS: 127 U/L / ALT: 7 U/L / AST: 10 U/L / GGT: x           ABO Interpretation: O (03-16-22 @ 07:23)    IMAGING: review

## 2022-03-17 NOTE — CHART NOTE - NSCHARTNOTEFT_GEN_A_CORE
POD 0. Seen at bedside in patient room. Patient endorses she feels strange after the anesthesia and wonders when it will wear off. Endorses her head feels heavy. Endorses she has urinated and is tolerating liquids without any pain. Denies nausea, vomiting. Endorses some mild "soreness" in her incisions.     Physical exam:    General- NAD. Sitting upright in bed, pleasant and response.  Abdomen: Obese, soft, non distended. Not tender to palpation. Trocar sites c/d/i without strike through. Mildly tender at umbilicus trocar site.  Lungs- Breathing comfortably on room air    Vitals:    /78 HR 86 SpO2 98 T 98.2 RR 18     A/P:    46 y/o F w/ PMH morbid obesity, hypertension, renal insufficiency, CHF with EF 38 to 40%, obstructive sleep apnea, gallstones now s/p lap cholecystectomy recovering well on floor.    -Low fat diet  -DVT ppx  -Pain control  -OOB  -Likely d/c tmr    [facility]  #[id number] POD 0. Seen at bedside in patient room. Patient endorses she feels strange after the anesthesia and wonders when it will wear off. Endorses her head feels heavy. Endorses she has urinated and is tolerating liquids without any pain. Denies nausea, vomiting. Endorses some mild "soreness" in her incisions.     Physical exam:    General- NAD. Sitting upright in bed, pleasant and response.  Abdomen: Obese, soft, non distended. Not tender to palpation. Trocar sites c/d/i without strike through. Mildly tender at umbilicus trocar site.  Lungs- Breathing comfortably on room air    Vitals:    /78 HR 86 SpO2 98 T 98.2 RR 18     A/P:    46 y/o F w/ PMH morbid obesity, hypertension, renal insufficiency, CHF with EF 38 to 40%, obstructive sleep apnea, gallstones now s/p lap cholecystectomy recovering well on floor.    -Low fat diet  -F/u Cards recs 2/2 intraop hypotension  -DVT ppx  -Pain control  -OOB  -Likely d/c tmr    [facility]  #[id number] POD 0. Seen at bedside in patient room. Patient endorses she feels strange after the anesthesia and wonders when it will wear off. Endorses her head feels heavy. Endorses she has urinated and is tolerating liquids without any pain. Denies nausea, vomiting. Endorses some mild "soreness" in her incisions.     Physical exam:    General- NAD. Sitting upright in bed, pleasant and responsive.  Abdomen: Obese, soft, non distended. Not tender to palpation. Trocar sites c/d/i without strike through. Mildly tender at umbilicus trocar site.  Lungs- Breathing comfortably on room air    Vitals:    /78 HR 86 SpO2 98 T 98.2 RR 18     A/P:    48 y/o F w/ PMH morbid obesity, hypertension, renal insufficiency, CHF with EF 38 to 40%, obstructive sleep apnea, gallstones now s/p lap cholecystectomy recovering well on floor.    -Low fat diet  -F/u Cards recs 2/2 intraop hypotension  -DVT ppx  -Pain control  -OOB  -Likely d/c tmr    [facility]  #[id number]

## 2022-03-17 NOTE — PRE-ANESTHESIA EVALUATION ADULT - NSANTHPMHFT_GEN_ALL_CORE
47F w/ morbid obesity, HTN, CKD, nonischemic cardiomyopathy- HFrEF 35%, DAMION, severe pulmonary HTN, gallstones

## 2022-03-17 NOTE — CONSULT NOTE ADULT - SUBJECTIVE AND OBJECTIVE BOX
DATE OF SERVICE: 03-17-22 @ 21:44    CHIEF COMPLAINT:Patient is a 47y old  Female who presents with a chief complaint of Symptomatic cholelithiasis (17 Mar 2022 02:28)      HISTORY OF PRESENT ILLNESS:HPI:  47F with PMHx morbid obesity, hypertension, renal insufficiency, CHF with EF 38 to 40%, obstructive sleep apnea, gallstones presenting with acute onset RUQ pain beginning early this morning. Patient reports this pain is similar to the biliary colic she experiences in setting of known history of gallstones. Reports some associated nausea. Denies fever/chills, vomiting, diarrhea/constipation. Last had PO intake yesterday evening, having normal GI function. Pain controlled after receiving morphine in ED. Follows with Dr. Diamond, currently scheduled for elective laparoscopic cholecystectomy on 3/23/22, underwent pre-operative clearance with Dr. Crawford on 3/7/22.     In ED, patient hemodynamically stable, afebrile, WBC 8, SCr 1.5 (1.25 from 2020), Tbili 0.4, , RUQ sono showing cholelithiasis and a nonmobile stone in the neck of the gallbladder.  (16 Mar 2022 16:33)      PAST MEDICAL & SURGICAL HISTORY:  Class 3 severe obesity with body mass index (BMI) greater than or equal to 70 in adult, unspecified obesity type, unspecified whether serious comorbidity present    Essential hypertension    Smoker    CHF (congestive heart failure), NYHA class II, chronic, systolic    No significant past surgical history            MEDICATIONS:  heparin   Injectable 5000 Unit(s) SubCutaneous every 8 hours  metoprolol succinate  milliGRAM(s) Oral daily        acetaminophen     Tablet .. 650 milliGRAM(s) Oral every 6 hours PRN  oxyCODONE    IR 5 milliGRAM(s) Oral every 6 hours PRN  oxyCODONE    IR 10 milliGRAM(s) Oral every 6 hours PRN    pantoprazole    Tablet 40 milliGRAM(s) Oral before breakfast      influenza   Vaccine 0.5 milliLiter(s) IntraMuscular once      FAMILY HISTORY:  Family history of cerebrovascular accident (CVA) (Father)    Family history of lung cancer (Mother)        Non-contributory    SOCIAL HISTORY:    [ ] not a smoker    Allergies    No Known Allergies    Intolerances    	    REVIEW OF SYSTEMS:  CONSTITUTIONAL: No fever  EYES: No eye pain, visual disturbances, or discharge  ENMT:  No difficulty hearing, tinnitus  NECK: No pain or stiffness  RESPIRATORY: No cough, wheezing,  CARDIOVASCULAR: No chest pain, palpitations, passing out, dizziness, or leg swelling  GASTROINTESTINAL:  No nausea, vomiting, diarrhea or constipation. No melena.  GENITOURINARY: No dysuria, hematuria  NEUROLOGICAL: No stroke like symptoms  SKIN: No burning or lesions   ENDOCRINE: No heat or cold intolerance  MUSCULOSKELETAL: No joint pain or swelling  PSYCHIATRIC: No  anxiety, mood swings  HEME/LYMPH: No bleeding gums  ALLERGY AND IMMUNOLOGIC: No hives or eczema	    All other ROS negative    PHYSICAL EXAM:  T(C): 36.8 (03-17-22 @ 20:21), Max: 37 (03-17-22 @ 05:52)  HR: 84 (03-17-22 @ 20:21) (63 - 86)  BP: 110/73 (03-17-22 @ 20:21) (110/73 - 138/78)  RR: 18 (03-17-22 @ 20:21) (16 - 18)  SpO2: 97% (03-17-22 @ 20:21) (94% - 100%)  Wt(kg): --  I&O's Summary    16 Mar 2022 07:01  -  17 Mar 2022 07:00  --------------------------------------------------------  IN: 1200 mL / OUT: 0 mL / NET: 1200 mL    17 Mar 2022 07:01  -  17 Mar 2022 21:44  --------------------------------------------------------  IN: 200 mL / OUT: 200 mL / NET: 0 mL        Appearance: Normal	  HEENT:   Normal oral mucosa, EOMI	  Cardiovascular:  S1 S2, No JVD,    Respiratory: Lungs clear to auscultation	  Psychiatry: Alert  Gastrointestinal:  Soft, Non-tender, + BS	  Skin: No rashes   Neurologic: Non-focal  Extremities:  No edema  Vascular: Peripheral pulses palpable    	    	  	  CARDIAC MARKERS:  Labs personally reviewed by me                                  10.0   6.10  )-----------( 300      ( 17 Mar 2022 07:21 )             33.8     03-17    139  |  104  |  10  ----------------------------<  108<H>  4.1   |  26  |  1.51<H>    Ca    9.1      17 Mar 2022 07:21  Phos  3.3     03-17  Mg     2.2     03-17    TPro  7.1  /  Alb  3.8  /  TBili  0.6  /  DBili  x   /  AST  11  /  ALT  7<L>  /  AlkPhos  120  03-17          EKG: Personally reviewed by me - NSR      Assessment and Plan:   · Assessment	  47F with PMHx morbid obesity, HTN, renal insufficiency, CHF with EF 38 to 40%, obstructive sleep apnea, gallstones presenting with acute onset RUQ pain consistent with symptomatic cholelithiasis.    Plan:  1. Chronic systolic HF - appears euvolemic post op  - on Toprol 100mg qd  - resume Entresto 49/51 BID in AM if BP stable  - Also on Aldactone 25 and Farxiga 10mg as OP, may resume as OP     2. CKD - stable at about 1.5  - on Lasix 20mg     3. Cholelithiasis - s/p cholecystectomy, tolerated surgery overall well       Differential diagnosis and plan of care discussed with patient after the evaluation. Counseling on diet, nutritional counseling, weight management, exercise and medication compliance was done.   Advanced care planning/advanced directives discussed with patient/family. DNR status including forceful chest compressions to attempt to restart the heart, ventilator support/artificial breathing, electric shock, artificial nutrition, health care proxy, Molst form all discussed with pt. Pt wishes to consider. More than fifteen minutes spent on discussing advanced directives.          Sudhir Devlin DO [facility]  Cardiovascular Medicine  [address] [place] [address], [address]  Office [phone]  Cell [phone]

## 2022-03-17 NOTE — PROGRESS NOTE ADULT - ASSESSMENT
47F with PMHx morbid obesity, hypertension, renal insufficiency, CHF with EF 38 to 40%, obstructive sleep apnea, gallstones presenting with acute onset RUQ pain consistent with symptomatic cholelithiasis.    Plan:  - Pre-op and consent for laparoscopic cholecystectomy tomorrow, 03/17  - Covid neg, NPO, HCG negative  - MRCP for elevated ALP: patient could not tolerate MRI due to claustrophobia  - Regular diet, NPO at midnight  - Pain control PRN  - Holding home anti-HTN meds in setting of well-controlled BPs  - DVT ppx/OOB/IS    [facility]  [id number]   47F with PMHx morbid obesity, HTN, renal insufficiency, CHF with EF 38 to 40%, obstructive sleep apnea, gallstones presenting with acute onset RUQ pain consistent with symptomatic cholelithiasis.    Plan:  - Preopped and consented for lap rocco today  - Covid neg, NPO, HCG negative  - MRCP for elevated ALP: patient could not tolerate MRI due to claustrophobia  - Regular diet, NPO until OR  - Pain control PRN  - Holding home anti-HTN meds in setting of well-controlled BPs  - DVT ppx/OOB/IS    [facility], [id number]

## 2022-03-18 ENCOUNTER — TRANSCRIPTION ENCOUNTER (OUTPATIENT)
Age: 47
End: 2022-03-18

## 2022-03-18 VITALS
SYSTOLIC BLOOD PRESSURE: 106 MMHG | DIASTOLIC BLOOD PRESSURE: 60 MMHG | HEART RATE: 84 BPM | TEMPERATURE: 98 F | RESPIRATION RATE: 18 BRPM | OXYGEN SATURATION: 97 %

## 2022-03-18 LAB
ALBUMIN SERPL ELPH-MCNC: 3.9 G/DL — SIGNIFICANT CHANGE UP (ref 3.3–5)
ALP SERPL-CCNC: 117 U/L — SIGNIFICANT CHANGE UP (ref 40–120)
ALT FLD-CCNC: 13 U/L — SIGNIFICANT CHANGE UP (ref 10–45)
ANION GAP SERPL CALC-SCNC: 11 MMOL/L — SIGNIFICANT CHANGE UP (ref 5–17)
AST SERPL-CCNC: 19 U/L — SIGNIFICANT CHANGE UP (ref 10–40)
BILIRUB SERPL-MCNC: 0.3 MG/DL — SIGNIFICANT CHANGE UP (ref 0.2–1.2)
BUN SERPL-MCNC: 13 MG/DL — SIGNIFICANT CHANGE UP (ref 7–23)
CALCIUM SERPL-MCNC: 9.7 MG/DL — SIGNIFICANT CHANGE UP (ref 8.4–10.5)
CHLORIDE SERPL-SCNC: 103 MMOL/L — SIGNIFICANT CHANGE UP (ref 96–108)
CO2 SERPL-SCNC: 23 MMOL/L — SIGNIFICANT CHANGE UP (ref 22–31)
CREAT SERPL-MCNC: 1.45 MG/DL — HIGH (ref 0.5–1.3)
EGFR: 44 ML/MIN/1.73M2 — LOW
GLUCOSE SERPL-MCNC: 123 MG/DL — HIGH (ref 70–99)
HCT VFR BLD CALC: 34 % — LOW (ref 34.5–45)
HGB BLD-MCNC: 10.4 G/DL — LOW (ref 11.5–15.5)
MAGNESIUM SERPL-MCNC: 2.2 MG/DL — SIGNIFICANT CHANGE UP (ref 1.6–2.6)
MCHC RBC-ENTMCNC: 24.5 PG — LOW (ref 27–34)
MCHC RBC-ENTMCNC: 30.6 GM/DL — LOW (ref 32–36)
MCV RBC AUTO: 80 FL — SIGNIFICANT CHANGE UP (ref 80–100)
NRBC # BLD: 0 /100 WBCS — SIGNIFICANT CHANGE UP (ref 0–0)
PHOSPHATE SERPL-MCNC: 3 MG/DL — SIGNIFICANT CHANGE UP (ref 2.5–4.5)
PLATELET # BLD AUTO: 357 K/UL — SIGNIFICANT CHANGE UP (ref 150–400)
POTASSIUM SERPL-MCNC: 4.4 MMOL/L — SIGNIFICANT CHANGE UP (ref 3.5–5.3)
POTASSIUM SERPL-SCNC: 4.4 MMOL/L — SIGNIFICANT CHANGE UP (ref 3.5–5.3)
PROT SERPL-MCNC: 7.7 G/DL — SIGNIFICANT CHANGE UP (ref 6–8.3)
RBC # BLD: 4.25 M/UL — SIGNIFICANT CHANGE UP (ref 3.8–5.2)
RBC # FLD: 16.3 % — HIGH (ref 10.3–14.5)
SODIUM SERPL-SCNC: 137 MMOL/L — SIGNIFICANT CHANGE UP (ref 135–145)
WBC # BLD: 10.59 K/UL — HIGH (ref 3.8–10.5)
WBC # FLD AUTO: 10.59 K/UL — HIGH (ref 3.8–10.5)

## 2022-03-18 PROCEDURE — 85027 COMPLETE CBC AUTOMATED: CPT

## 2022-03-18 PROCEDURE — 86850 RBC ANTIBODY SCREEN: CPT

## 2022-03-18 PROCEDURE — A9537: CPT

## 2022-03-18 PROCEDURE — 82947 ASSAY GLUCOSE BLOOD QUANT: CPT

## 2022-03-18 PROCEDURE — C9399: CPT

## 2022-03-18 PROCEDURE — 82435 ASSAY OF BLOOD CHLORIDE: CPT

## 2022-03-18 PROCEDURE — 84100 ASSAY OF PHOSPHORUS: CPT

## 2022-03-18 PROCEDURE — 36415 COLL VENOUS BLD VENIPUNCTURE: CPT

## 2022-03-18 PROCEDURE — 82330 ASSAY OF CALCIUM: CPT

## 2022-03-18 PROCEDURE — 84295 ASSAY OF SERUM SODIUM: CPT

## 2022-03-18 PROCEDURE — 85018 HEMOGLOBIN: CPT

## 2022-03-18 PROCEDURE — 84132 ASSAY OF SERUM POTASSIUM: CPT

## 2022-03-18 PROCEDURE — 83690 ASSAY OF LIPASE: CPT

## 2022-03-18 PROCEDURE — 86900 BLOOD TYPING SEROLOGIC ABO: CPT

## 2022-03-18 PROCEDURE — 76700 US EXAM ABDOM COMPLETE: CPT

## 2022-03-18 PROCEDURE — 80053 COMPREHEN METABOLIC PANEL: CPT

## 2022-03-18 PROCEDURE — C1769: CPT

## 2022-03-18 PROCEDURE — 88304 TISSUE EXAM BY PATHOLOGIST: CPT

## 2022-03-18 PROCEDURE — 85014 HEMATOCRIT: CPT

## 2022-03-18 PROCEDURE — 84702 CHORIONIC GONADOTROPIN TEST: CPT

## 2022-03-18 PROCEDURE — 99285 EMERGENCY DEPT VISIT HI MDM: CPT

## 2022-03-18 PROCEDURE — 82803 BLOOD GASES ANY COMBINATION: CPT

## 2022-03-18 PROCEDURE — C1889: CPT

## 2022-03-18 PROCEDURE — 85025 COMPLETE CBC W/AUTO DIFF WBC: CPT

## 2022-03-18 PROCEDURE — 83605 ASSAY OF LACTIC ACID: CPT

## 2022-03-18 PROCEDURE — 78227 HEPATOBIL SYST IMAGE W/DRUG: CPT

## 2022-03-18 PROCEDURE — 96374 THER/PROPH/DIAG INJ IV PUSH: CPT

## 2022-03-18 PROCEDURE — U0005: CPT

## 2022-03-18 PROCEDURE — U0003: CPT

## 2022-03-18 PROCEDURE — 86901 BLOOD TYPING SEROLOGIC RH(D): CPT

## 2022-03-18 PROCEDURE — 96375 TX/PRO/DX INJ NEW DRUG ADDON: CPT

## 2022-03-18 PROCEDURE — C1758: CPT

## 2022-03-18 PROCEDURE — 85610 PROTHROMBIN TIME: CPT

## 2022-03-18 PROCEDURE — 93005 ELECTROCARDIOGRAM TRACING: CPT

## 2022-03-18 PROCEDURE — 85730 THROMBOPLASTIN TIME PARTIAL: CPT

## 2022-03-18 PROCEDURE — 83735 ASSAY OF MAGNESIUM: CPT

## 2022-03-18 RX ORDER — ACETAMINOPHEN 500 MG
2 TABLET ORAL
Qty: 0 | Refills: 0 | DISCHARGE
Start: 2022-03-18

## 2022-03-18 RX ORDER — OXYCODONE HYDROCHLORIDE 5 MG/1
1 TABLET ORAL
Qty: 8 | Refills: 0
Start: 2022-03-18

## 2022-03-18 RX ORDER — FUROSEMIDE 40 MG
1 TABLET ORAL
Qty: 0 | Refills: 0 | DISCHARGE

## 2022-03-18 RX ORDER — SPIRONOLACTONE 25 MG/1
1 TABLET, FILM COATED ORAL
Qty: 0 | Refills: 0 | DISCHARGE

## 2022-03-18 RX ADMIN — Medication 650 MILLIGRAM(S): at 06:21

## 2022-03-18 RX ADMIN — Medication 100 MILLIGRAM(S): at 05:50

## 2022-03-18 RX ADMIN — Medication 650 MILLIGRAM(S): at 05:51

## 2022-03-18 RX ADMIN — OXYCODONE HYDROCHLORIDE 10 MILLIGRAM(S): 5 TABLET ORAL at 09:27

## 2022-03-18 RX ADMIN — HEPARIN SODIUM 5000 UNIT(S): 5000 INJECTION INTRAVENOUS; SUBCUTANEOUS at 05:51

## 2022-03-18 RX ADMIN — PANTOPRAZOLE SODIUM 40 MILLIGRAM(S): 20 TABLET, DELAYED RELEASE ORAL at 05:51

## 2022-03-18 NOTE — DISCHARGE NOTE PROVIDER - CARE PROVIDER_API CALL
Lucas Diamond  General Surgery  [address] [place] [address], [address]  [place], [place] [id number]  Phone: [phone]  Fax: [phone]  Follow Up Time: 2 weeks

## 2022-03-18 NOTE — DISCHARGE NOTE NURSING/CASE MANAGEMENT/SOCIAL WORK - NSDCPEFALRISK_GEN_ALL_CORE
For information on Fall & Injury Prevention, visit: [email or web address] OR  [email or web address] OR  https://www.cdc.gov/steadi/patient.html

## 2022-03-18 NOTE — DISCHARGE NOTE PROVIDER - NSDCCPTREATMENT_GEN_ALL_CORE_FT
PRINCIPAL PROCEDURE  Procedure: Laparoscopic cholecystostomy  Findings and Treatment: WOUND CARE: You may shower. Pat Dry abdomen. Leave the white steri strips in place, they will fall off on their own in approximately 5-7 days.  BATHING: Please do not submerge wound underwater. You may shower and/or sponge bathe.  ACTIVITY: No heavy lifting anything more than 10-15lbs or straining. Otherwise, you may return to your usual level of physical activity. If you are taking narcotic pain medication (such as Percocet), do NOT drive a car, operate machinery or make important decisions.  DIET: Low fat diet  NOTIFY YOUR SURGEON IF: You have any bleeding that does not stop, any pus draining from your wound, any fever (over 100.4 F) or chills, persistent nausea/vomiting with inability to tolerate food or liquids, persistent diarrhea, or if your pain is not controlled on your discharge pain medications.  FOLLOW-UP:  1. Please call to make a follow-up appointment within one week of discharge   2. Please follow up with your primary care physician in one week regarding your hospitalization.

## 2022-03-18 NOTE — DISCHARGE NOTE PROVIDER - NSDCFUSCHEDAPPT_GEN_ALL_CORE_FT
VALENTIN WOOD ; 03/20/2022 ; [facility]  VALENTIN WOOD ; 03/21/2022 ; [facility] [address]  VALENTIN WOOD ; 03/21/2022 ; [facility] [address]  VALENTIN WOOD ; 03/22/2022 ; [facility]  [address]  VALENTIN WOOD ; 03/23/2022 ; [facility]  VALENTIN WOOD ; 04/27/2022 ; [facility] [address] [facility]

## 2022-03-18 NOTE — PROGRESS NOTE ADULT - SUBJECTIVE AND OBJECTIVE BOX
Date of Service   03-18-22 @ 11:02    Patient is a 47y old  Female who presents with a chief complaint of Symptomatic cholelithiasis (18 Mar 2022 06:01)      INTERVAL HISTORY: pt feels ok no complaints     REVIEW OF SYSTEMS:   CONSTITUTIONAL: No weakness  EYES/ENT: No visual changes; No throat pain  Neck: No pain or stiffness  Respiratory: No cough, wheezing, No shortness of breath  CARDIOVASCULAR: no chest pain or palpitations  GASTROINTESTINAL: + mild abdominal pain s/p procedure, no nausea, vomiting or hematemesis  GENITOURINARY: No dysuria, frequency or hematuria  NEUROLOGICAL: No stroke like symptoms  SKIN: No rashes    	  MEDICATIONS:  metoprolol succinate  milliGRAM(s) Oral daily        PHYSICAL EXAM:  T(C): 36.7 (03-18-22 @ 09:24), Max: 36.8 (03-17-22 @ 13:55)  HR: 84 (03-18-22 @ 09:24) (63 - 86)  BP: 106/60 (03-18-22 @ 09:24) (106/60 - 137/81)  RR: 18 (03-18-22 @ 09:24) (16 - 18)  SpO2: 97% (03-18-22 @ 09:24) (94% - 99%)  Wt(kg): --  I&O's Summary    17 Mar 2022 07:01  -  18 Mar 2022 07:00  --------------------------------------------------------  IN: 200 mL / OUT: 700 mL / NET: -500 mL          Appearance: In no distress	  HEENT:    PERRL, EOMI	  Cardiovascular:  S1 S2, No JVD  Respiratory: Lungs clear to auscultation	  Gastrointestinal:  Soft, Non-tender, + BS	  Vascularature:  No edema of LE  Psychiatric: Appropriate affect   Neuro: no acute focal deficits                               10.4   10.59 )-----------( 357      ( 18 Mar 2022 07:53 )             34.0     03-18    137  |  103  |  13  ----------------------------<  123<H>  4.4   |  23  |  1.45<H>    Ca    9.7      18 Mar 2022 07:53  Phos  3.0     03-18  Mg     2.2     03-18    TPro  7.7  /  Alb  3.9  /  TBili  0.3  /  DBili  x   /  AST  19  /  ALT  13  /  AlkPhos  117  03-18        Labs personally reviewed      ASSESSMENT/PLAN: 	  47F with PMHx morbid obesity, HTN, renal insufficiency, CHF with EF 38 to 40%, obstructive sleep apnea, gallstones presenting with acute onset RUQ pain consistent with symptomatic cholelithiasis.    Plan:  1. Chronic systolic HF - appears euvolemic post op  - on Toprol 100mg qd  - resume Entresto 49/51 BID in AM if BP stable  - Also on Aldactone 25 and Farxiga 10mg as OP, may resume as OP   - BP stable     2. CKD - stable at about 1.5  - on Lasix 20mg     3. Cholelithiasis - s/p cholecystectomy, tolerated surgery overall well   pain management       Mick Williamson [facility]   Sudhir Devlin DO [facility]  Cardiovascular Medicine  [address] [place] [address], [address]  Office: [phone]

## 2022-03-18 NOTE — PROGRESS NOTE ADULT - ATTENDING COMMENTS
For lap rocco this morning
POD1 s/p lap cholecystectomy  Doing well, minimal incisional pain, tolerating PO    Afebrile VSS  Abd soft, NT, ND  Inc clean and dry    Plan  Appreciate cardiology evaluation -- resume home meds  Discharge planning today    Lucas Diamond MD

## 2022-03-18 NOTE — DISCHARGE NOTE PROVIDER - HOSPITAL COURSE
47F with PMHx morbid obesity, hypertension, renal insufficiency, CHF with EF 38 to 40%, obstructive sleep apnea, gallstones presenting with acute onset RUQ pain beginning early this morning. Patient reports this pain is similar to the biliary colic she experiences in setting of known history of gallstones. Reports some associated nausea. Denies fever/chills, vomiting, diarrhea/constipation. Last had PO intake yesterday evening, having normal GI function. Pain controlled after receiving morphine in ED. Follows with Dr. Diamond, currently scheduled for elective laparoscopic cholecystectomy on 3/23/22, underwent pre-operative clearance with Dr. Crawford on 3/7/22.     On 3/17 patient admitted for scheduled procedure. Patient underwent a laparoscopic cholecystectomy. patient with intraoperative hypotension. The patient tolerated the procedure well without complications, was extubated, and transferred to the PACU in stable condition. Cardiology evaluated post- operatively. Diet was advanced as tolerated and patient treated with pain control. On day of discharge, the patient was tolerating diet, ambulating well and pain controlled.      47F with PMHx morbid obesity, hypertension, renal insufficiency, CHF with EF 38 to 40%, obstructive sleep apnea, gallstones presenting with acute onset RUQ pain beginning early this morning. Patient reports this pain is similar to the biliary colic she experiences in setting of known history of gallstones. Reports some associated nausea. Denies fever/chills, vomiting, diarrhea/constipation. Last had PO intake yesterday evening, having normal GI function. Pain controlled after receiving morphine in ED. Follows with Dr. Diamond, currently scheduled for elective laparoscopic cholecystectomy on 3/23/22, underwent pre-operative clearance with Dr. Crawford on 3/7/22.     On 3/17 patient admitted for scheduled procedure. Patient underwent a laparoscopic cholecystectomy. patient with intraoperative hypotension. The patient tolerated the procedure well without complications, was extubated, and transferred to the PACU in stable condition. Cardiology evaluated post- operatively. Diet was advanced as tolerated and patient treated with pain control. On day of discharge, the patient was tolerating diet, ambulating well and pain controlled.

## 2022-03-18 NOTE — DISCHARGE NOTE PROVIDER - NSDCMRMEDTOKEN_GEN_ALL_CORE_FT
aspirin 81 mg oral tablet: 1 tab(s) orally once a day  Entresto 49 mg-51 mg oral tablet: 1 tab(s) orally 2 times a day  Farxiga 10 mg oral tablet: 1 tab(s) orally once a day  Lasix 20 mg oral tablet: 1 tab(s) orally 2 times a day  Metoprolol Succinate  mg oral tablet, extended release: 1 tab(s) orally once a day  Protonix 40 mg oral delayed release tablet: 1 tab(s) orally once a day  spironolactone 25 mg oral tablet: 1 tab(s) orally once a day  Vitamin D3 125 mcg (5000 intl units) oral capsule: 1 tab(s) orally once a week   acetaminophen 325 mg oral tablet: 2 tab(s) orally every 6 hours, As needed, Mild Pain (1 - 3)  aspirin 81 mg oral tablet: 1 tab(s) orally once a day  Entresto 49 mg-51 mg oral tablet: 1 tab(s) orally 2 times a day  Farxiga 10 mg oral tablet: 1 tab(s) orally once a day  Metoprolol Succinate  mg oral tablet, extended release: 1 tab(s) orally once a day  oxyCODONE 5 mg oral capsule: 1 cap(s) orally every 4 hours MDD:4  Protonix 40 mg oral delayed release tablet: 1 tab(s) orally once a day  Vitamin D3 125 mcg (5000 intl units) oral capsule: 1 tab(s) orally once a week

## 2022-03-18 NOTE — PROGRESS NOTE ADULT - SUBJECTIVE AND OBJECTIVE BOX
TEAM [facility] Progress Note    INTERVAL EVENTS: Patient is s/p lap cholecystectomy. No acute events overnight.   SUBJECTIVE: Patient seen and examined at bedside with surgical team        OBJECTIVE:    Vital Signs Last 24 Hrs  T(C): 36.8 (17 Mar 2022 20:21), Max: 37 (17 Mar 2022 05:52)  T(F): 98.3 (17 Mar 2022 20:21), Max: 98.6 (17 Mar 2022 05:52)  HR: 84 (17 Mar 2022 20:21) (63 - 86)  BP: 110/73 (17 Mar 2022 20:21) (110/73 - 137/81)  BP(mean): 89 (17 Mar 2022 11:45) (89 - 103)  RR: 18 (17 Mar 2022 20:21) (16 - 18)  SpO2: 97% (17 Mar 2022 20:21) (94% - 100%)I&O's Detail    16 Mar 2022 07:01  -  17 Mar 2022 07:00  --------------------------------------------------------  IN:    dextrose 5% + sodium chloride 0.45% w/ Additives: 1200 mL  Total IN: 1200 mL    OUT:    Oral Fluid: 0 mL    Voided (mL): 0 mL  Total OUT: 0 mL    Total NET: 1200 mL      17 Mar 2022 07:01  -  18 Mar 2022 01:28  --------------------------------------------------------  IN:    Oral Fluid: 200 mL  Total IN: 200 mL    OUT:    Lactated Ringers: 0 mL    Voided (mL): 400 mL  Total OUT: 400 mL    Total NET: -200 mL      MEDICATIONS  (STANDING):  heparin   Injectable 5000 Unit(s) SubCutaneous every 8 hours  influenza   Vaccine 0.5 milliLiter(s) IntraMuscular once  metoprolol succinate  milliGRAM(s) Oral daily  pantoprazole    Tablet 40 milliGRAM(s) Oral before breakfast    MEDICATIONS  (PRN):  acetaminophen     Tablet .. 650 milliGRAM(s) Oral every 6 hours PRN Mild Pain (1 - 3)  oxyCODONE    IR 5 milliGRAM(s) Oral every 6 hours PRN Moderate Pain (4 - 6)  oxyCODONE    IR 10 milliGRAM(s) Oral every 6 hours PRN Severe Pain (7 - 10)      PHYSICAL EXAM:  General- NAD. Sitting upright in bed, pleasant and responsive.  Abdomen: Obese, soft, non distended. Not tender to palpation. Trocar sites c/d/i without strike through. Mildly tender at umbilicus trocar site.  Lungs- Breathing comfortably on room air    LABS:                        10.0   6.10  )-----------( 300      ( 17 Mar 2022 07:21 )             33.8     03-17    139  |  104  |  10  ----------------------------<  108<H>  4.1   |  26  |  1.51<H>    Ca    9.1      17 Mar 2022 07:21  Phos  3.3     03-17  Mg     2.2     03-17    TPro  7.1  /  Alb  3.8  /  TBili  0.6  /  DBili  x   /  AST  11  /  ALT  7<L>  /  AlkPhos  120  03-17    PT/INR - ( 16 Mar 2022 06:58 )   PT: 13.0 sec;   INR: 1.12 ratio         PTT - ( 16 Mar 2022 06:58 )  PTT:32.8 sec  LIVER FUNCTIONS - ( 17 Mar 2022 07:21 )  Alb: 3.8 g/dL / Pro: 7.1 g/dL / ALK PHOS: 120 U/L / ALT: 7 U/L / AST: 11 U/L / GGT: x             ABO Interpretation: O (03-17-22 @ 06:22)      IMAGING:     TEAM Surgery Progress Note    INTERVAL EVENTS: Patient is s/p lap cholecystectomy. No acute events overnight. Tolerating diet. Cleared by Cards to resume home meds. Dressings removed, steris remaining.    SUBJECTIVE: Patient seen and examined at bedside with surgical team        OBJECTIVE:    Vital Signs Last 24 Hrs  T(C): 36.8 (17 Mar 2022 20:21), Max: 37 (17 Mar 2022 05:52)  T(F): 98.3 (17 Mar 2022 20:21), Max: 98.6 (17 Mar 2022 05:52)  HR: 84 (17 Mar 2022 20:21) (63 - 86)  BP: 110/73 (17 Mar 2022 20:21) (110/73 - 137/81)  BP(mean): 89 (17 Mar 2022 11:45) (89 - 103)  RR: 18 (17 Mar 2022 20:21) (16 - 18)  SpO2: 97% (17 Mar 2022 20:21) (94% - 100%)I&O's Detail    16 Mar 2022 07:01  -  17 Mar 2022 07:00  --------------------------------------------------------  IN:    dextrose 5% + sodium chloride 0.45% w/ Additives: 1200 mL  Total IN: 1200 mL    OUT:    Oral Fluid: 0 mL    Voided (mL): 0 mL  Total OUT: 0 mL    Total NET: 1200 mL      17 Mar 2022 07:01  -  18 Mar 2022 01:28  --------------------------------------------------------  IN:    Oral Fluid: 200 mL  Total IN: 200 mL    OUT:    Lactated Ringers: 0 mL    Voided (mL): 400 mL  Total OUT: 400 mL    Total NET: -200 mL      MEDICATIONS  (STANDING):  heparin   Injectable 5000 Unit(s) SubCutaneous every 8 hours  influenza   Vaccine 0.5 milliLiter(s) IntraMuscular once  metoprolol succinate  milliGRAM(s) Oral daily  pantoprazole    Tablet 40 milliGRAM(s) Oral before breakfast    MEDICATIONS  (PRN):  acetaminophen     Tablet .. 650 milliGRAM(s) Oral every 6 hours PRN Mild Pain (1 - 3)  oxyCODONE    IR 5 milliGRAM(s) Oral every 6 hours PRN Moderate Pain (4 - 6)  oxyCODONE    IR 10 milliGRAM(s) Oral every 6 hours PRN Severe Pain (7 - 10)      PHYSICAL EXAM:  General- NAD. Sitting upright in bed, pleasant and responsive.  Abdomen: Obese, soft, non distended. Not tender to palpation. Trocar sites c/d/i without strike through. Mildly tender at umbilicus trocar site.  Lungs- Breathing comfortably on room air    LABS:                        10.0   6.10  )-----------( 300      ( 17 Mar 2022 07:21 )             33.8     03-17    139  |  104  |  10  ----------------------------<  108<H>  4.1   |  26  |  1.51<H>    Ca    9.1      17 Mar 2022 07:21  Phos  3.3     03-17  Mg     2.2     03-17    TPro  7.1  /  Alb  3.8  /  TBili  0.6  /  DBili  x   /  AST  11  /  ALT  7<L>  /  AlkPhos  120  03-17    PT/INR - ( 16 Mar 2022 06:58 )   PT: 13.0 sec;   INR: 1.12 ratio         PTT - ( 16 Mar 2022 06:58 )  PTT:32.8 sec  LIVER FUNCTIONS - ( 17 Mar 2022 07:21 )  Alb: 3.8 g/dL / Pro: 7.1 g/dL / ALK PHOS: 120 U/L / ALT: 7 U/L / AST: 11 U/L / GGT: x             ABO Interpretation: O (03-17-22 @ 06:22)      IMAGING:

## 2022-03-18 NOTE — PROGRESS NOTE ADULT - ASSESSMENT
48 y/o F w/ PMH morbid obesity, hypertension, renal insufficiency, CHF with EF 38 to 40%, obstructive sleep apnea, gallstones now s/p lap cholecystectomy recovering well on floor.    -Low fat diet  -F/u Cards recs 2/2 intraop hypotension  -DVT ppx  -Pain control  -OOB  -Likely d/c today 3/18    [facility]  #[id number]. 46 y/o F w/ PMH morbid obesity, hypertension, renal insufficiency, CHF with EF 38 to 40%, obstructive sleep apnea, gallstones now s/p lap cholecystectomy recovering well on floor.    -Low fat diet  -F/u Cards recs 2/2 intraop hypotension: "resume Entresto 49/51 BID in AM if BP stable. Also on Aldactone 25 and Farxiga 10mg as OP, may resume as OP"  -DVT ppx  -Pain control  -OOB  -Likely d/c today 3/18    [facility]  #[id number]. 46 y/o F w/ PMH morbid obesity, hypertension, renal insufficiency, CHF with EF 38 to 40%, obstructive sleep apnea, gallstones now s/p lap cholecystectomy recovering well on floor.    -Low fat diet  -Cleared by Cards to resume OP meds, appreciate recs  -DVT ppx  -Pain control  -OOB  -Likely d/c today 3/18    [facility]  #[id number].

## 2022-03-21 ENCOUNTER — APPOINTMENT (OUTPATIENT)
Dept: PULMONOLOGY | Facility: CLINIC | Age: 47
End: 2022-03-21
Payer: COMMERCIAL

## 2022-03-21 VITALS
HEART RATE: 93 BPM | SYSTOLIC BLOOD PRESSURE: 115 MMHG | TEMPERATURE: 98 F | WEIGHT: 293 LBS | RESPIRATION RATE: 16 BRPM | HEIGHT: 64 IN | BODY MASS INDEX: 50.02 KG/M2 | DIASTOLIC BLOOD PRESSURE: 81 MMHG | OXYGEN SATURATION: 100 %

## 2022-03-21 DIAGNOSIS — R06.83 SNORING: ICD-10-CM

## 2022-03-21 PROCEDURE — 94010 BREATHING CAPACITY TEST: CPT

## 2022-03-21 PROCEDURE — 94727 GAS DIL/WSHOT DETER LNG VOL: CPT

## 2022-03-21 PROCEDURE — 99213 OFFICE O/P EST LOW 20 MIN: CPT | Mod: 25

## 2022-03-21 PROCEDURE — 94729 DIFFUSING CAPACITY: CPT

## 2022-03-21 PROCEDURE — ZZZZZ: CPT

## 2022-03-21 RX ORDER — VARENICLINE TARTRATE 0.5 (11)-1
0.5 MG X 11 & KIT ORAL
Qty: 1 | Refills: 0 | Status: DISCONTINUED | COMMUNITY
Start: 2020-02-12 | End: 2022-03-21

## 2022-03-21 RX ORDER — AMOXICILLIN AND CLAVULANATE POTASSIUM 875; 125 MG/1; 1/1
875-125 TABLET, FILM COATED ORAL
Refills: 0 | Status: DISCONTINUED | COMMUNITY
End: 2022-03-21

## 2022-03-21 RX ORDER — PANTOPRAZOLE 40 MG/1
40 TABLET, DELAYED RELEASE ORAL
Refills: 0 | Status: DISCONTINUED | COMMUNITY
End: 2022-03-21

## 2022-03-22 ENCOUNTER — APPOINTMENT (OUTPATIENT)
Dept: RADIOLOGY | Facility: HOSPITAL | Age: 47
End: 2022-03-22

## 2022-03-22 NOTE — HISTORY OF PRESENT ILLNESS
[Current] : current [< 20 pack-years] : < 20 pack-years [Awakes Unrefreshed] : awakes unrefreshed [Awakes with Dry Mouth] : awakes with dry mouth [Daytime Somnolence] : daytime somnolence [Difficulty Maintaining Sleep] : difficulty maintaining sleep [Frequent Nocturnal Awakening] : frequent nocturnal awakening [Snoring] : snoring [TextBox_4] : Patient sent by Lucas Stone for pulmonary Consult for bariatric sleeve\par \par Last Thursday had gallbladder removed.  Did well.\par \par seeing Dr Crawford for cardio\par had recent CXR 2/2022\par  \par No significant cough, wheezing, chest pain or SOB. [TextBox_11] : .5 [TextBox_13] : 20 [Awakes with Headache] : does not awaken with headache [Recent  Weight Gain] : no recent weight gain [Witnessed Apneas] : no witnessed apneas

## 2022-03-22 NOTE — ASSESSMENT
[FreeTextEntry1] : Discontinue smoking.  Was given Chantix.\par For home sleep study.\par Further recommendations after review of above.

## 2022-03-22 NOTE — PROCEDURE
[FreeTextEntry1] : [place] is 9.\par \par Pulmonary function testing on March 21, 2022.\par FEV1, FVC, and FEV1/FVC are within normal limits. TLC and subdivisions are normal. RV/TLC ratio is normal. Single breath diffusion capacity is normal.

## 2022-03-22 NOTE — PHYSICAL EXAM
[Normal Oropharynx] : normal oropharynx [Normal Appearance] : normal appearance [No Neck Mass] : no neck mass [No JVD] : no jvd [Normal S1, S2] : normal s1, s2 [No Murmurs] : no murmurs [Clear to Auscultation Bilaterally] : clear to auscultation bilaterally [Normal to Percussion] : normal to percussion [Benign] : benign [No HSM] : no hsm [No Clubbing] : no clubbing [No Cyanosis] : no cyanosis [No Edema] : no edema [TextBox_2] : Overweight

## 2022-03-22 NOTE — DISCUSSION/SUMMARY
Quality 226: Preventive Care And Screening: Tobacco Use: Screening And Cessation Intervention: Patient screened for tobacco use and is an ex/non-smoker
Quality 131: Pain Assessment And Follow-Up: No documentation of pain assessment, reason not given
[FreeTextEntry1] : Obesity.\par Probable obstructive sleep apnea syndrome.\par Status post COVID without significant respiratory symptomatology.  Normal lung function.
Quality 130: Documentation Of Current Medications In The Medical Record: Current Medications Documented
Quality 110: Preventive Care And Screening: Influenza Immunization: Influenza immunization was not ordered or administered, reason not given
Detail Level: Zone
Quality 431: Preventive Care And Screening: Unhealthy Alcohol Use - Screening: Patient screened for unhealthy alcohol use using a single question and scores less than 2 times per year

## 2022-03-29 LAB — SURGICAL PATHOLOGY STUDY: SIGNIFICANT CHANGE UP

## 2022-03-31 ENCOUNTER — APPOINTMENT (OUTPATIENT)
Dept: CARDIOLOGY | Facility: CLINIC | Age: 47
End: 2022-03-31
Payer: COMMERCIAL

## 2022-03-31 ENCOUNTER — APPOINTMENT (OUTPATIENT)
Dept: PULMONOLOGY | Facility: CLINIC | Age: 47
End: 2022-03-31
Payer: COMMERCIAL

## 2022-03-31 ENCOUNTER — NON-APPOINTMENT (OUTPATIENT)
Age: 47
End: 2022-03-31

## 2022-03-31 VITALS
OXYGEN SATURATION: 99 % | BODY MASS INDEX: 49.68 KG/M2 | HEIGHT: 64 IN | WEIGHT: 291 LBS | SYSTOLIC BLOOD PRESSURE: 118 MMHG | DIASTOLIC BLOOD PRESSURE: 80 MMHG | HEART RATE: 72 BPM | TEMPERATURE: 98.3 F

## 2022-03-31 DIAGNOSIS — F17.200 NICOTINE DEPENDENCE, UNSPECIFIED, UNCOMPLICATED: ICD-10-CM

## 2022-03-31 PROCEDURE — 93000 ELECTROCARDIOGRAM COMPLETE: CPT

## 2022-03-31 PROCEDURE — 99213 OFFICE O/P EST LOW 20 MIN: CPT

## 2022-03-31 NOTE — PHYSICAL EXAM
[Well Developed] : well developed [Well Nourished] : well nourished [No Acute Distress] : no acute distress [Normal Conjunctiva] : normal conjunctiva [Normal Venous Pressure] : normal venous pressure [No Carotid Bruit] : no carotid bruit [Normal S1, S2] : normal S1, S2 [No Murmur] : no murmur [No Rub] : no rub [No Gallop] : no gallop [Clear Lung Fields] : clear lung fields [Good Air Entry] : good air entry [No Respiratory Distress] : no respiratory distress  [Soft] : abdomen soft [Non Tender] : non-tender [No Masses/organomegaly] : no masses/organomegaly [Normal Gait] : normal gait [Normal Bowel Sounds] : normal bowel sounds [No Edema] : no edema [No Cyanosis] : no cyanosis [No Clubbing] : no clubbing [No Varicosities] : no varicosities [No Rash] : no rash [No Skin Lesions] : no skin lesions [Moves all extremities] : moves all extremities [No Focal Deficits] : no focal deficits [Normal Speech] : normal speech [Alert and Oriented] : alert and oriented [Normal memory] : normal memory [de-identified] : site of abdominal surgery intact  [de-identified] : overweight

## 2022-03-31 NOTE — HISTORY OF PRESENT ILLNESS
[FreeTextEntry1] : pt presents for f/u s/p urgent lap cholecystectomy as pt with recurrent abdominal discomfort pt did well was taken off lasix and aldactone post op by surgeons however creat stable .pt also adressing sleep apnea with dr huitron pt with hgba1c 6.7% seeing nutritionist on [facility] pt denies any chest  pain dizziness ,lightheadedness ,nausea vomiting diaphoresis\par

## 2022-04-01 ENCOUNTER — APPOINTMENT (OUTPATIENT)
Dept: SURGERY | Facility: CLINIC | Age: 47
End: 2022-04-01

## 2022-04-03 PROCEDURE — 95800 SLP STDY UNATTENDED: CPT

## 2022-04-11 ENCOUNTER — APPOINTMENT (OUTPATIENT)
Dept: SURGERY | Facility: CLINIC | Age: 47
End: 2022-04-11
Payer: COMMERCIAL

## 2022-04-11 ENCOUNTER — APPOINTMENT (OUTPATIENT)
Dept: SURGERY | Facility: CLINIC | Age: 47
End: 2022-04-11

## 2022-04-11 VITALS
TEMPERATURE: 97.8 F | BODY MASS INDEX: 49.68 KG/M2 | HEIGHT: 64 IN | SYSTOLIC BLOOD PRESSURE: 133 MMHG | RESPIRATION RATE: 18 BRPM | DIASTOLIC BLOOD PRESSURE: 87 MMHG | OXYGEN SATURATION: 100 % | WEIGHT: 291 LBS | HEART RATE: 86 BPM

## 2022-04-11 DIAGNOSIS — Z90.49 ACQUIRED ABSENCE OF OTHER SPECIFIED PARTS OF DIGESTIVE TRACT: ICD-10-CM

## 2022-04-11 PROCEDURE — 99024 POSTOP FOLLOW-UP VISIT: CPT

## 2022-04-11 NOTE — PHYSICAL EXAM
[de-identified] : Well-appearing, no acute distress [de-identified] : Sclerae anicteric [de-identified] : Soft, nontender, nondistended.  Incisions well-healed

## 2022-04-11 NOTE — HISTORY OF PRESENT ILLNESS
[de-identified] : VALENTIN is a 47 year old female here for first follow-up visit s/p laparoscopic cholecystectomy on 03/17/22\par \par She is doing well postoperatively, reports almost complete resolution of incisional pain.  She is tolerating a low-fat diet and having normal bowel function.  She reports no fevers or chills.

## 2022-04-11 NOTE — PLAN
[FreeTextEntry1] : Continue low-fat diet\par Continue dietary modifications to improve weight loss.\par Follow-up 1 month

## 2022-04-11 NOTE — ASSESSMENT
[FreeTextEntry1] : 47-year-old female recovering uneventfully status post laparoscopic cholecystectomy.

## 2022-04-13 ENCOUNTER — NON-APPOINTMENT (OUTPATIENT)
Age: 47
End: 2022-04-13

## 2022-04-13 LAB
25(OH)D3 SERPL-MCNC: 15.4 NG/ML
ALBUMIN SERPL ELPH-MCNC: 4.1 G/DL
ALBUMIN SERPL ELPH-MCNC: 4.3 G/DL
ALP BLD-CCNC: 128 U/L
ALP BLD-CCNC: 139 U/L
ALT SERPL-CCNC: 16 U/L
ALT SERPL-CCNC: 8 U/L
AMYLASE/CREAT SERPL: 48 U/L
ANION GAP SERPL CALC-SCNC: 13 MMOL/L
ANION GAP SERPL CALC-SCNC: 14 MMOL/L
ANION GAP SERPL CALC-SCNC: 16 MMOL/L
APPEARANCE: CLEAR
AST SERPL-CCNC: 11 U/L
AST SERPL-CCNC: 15 U/L
BACTERIA: NEGATIVE
BASOPHILS # BLD AUTO: 0.04 K/UL
BASOPHILS # BLD AUTO: 0.05 K/UL
BASOPHILS NFR BLD AUTO: 0.4 %
BASOPHILS NFR BLD AUTO: 0.5 %
BILIRUB SERPL-MCNC: 0.3 MG/DL
BILIRUB SERPL-MCNC: 0.5 MG/DL
BILIRUBIN URINE: NEGATIVE
BLOOD URINE: NEGATIVE
BUN SERPL-MCNC: 11 MG/DL
BUN SERPL-MCNC: 15 MG/DL
BUN SERPL-MCNC: 9 MG/DL
CALCIUM SERPL-MCNC: 9.2 MG/DL
CALCIUM SERPL-MCNC: 9.6 MG/DL
CALCIUM SERPL-MCNC: 9.7 MG/DL
CHLORIDE SERPL-SCNC: 102 MMOL/L
CHLORIDE SERPL-SCNC: 105 MMOL/L
CHLORIDE SERPL-SCNC: 107 MMOL/L
CHOLEST SERPL-MCNC: 155 MG/DL
CO2 SERPL-SCNC: 20 MMOL/L
CO2 SERPL-SCNC: 22 MMOL/L
CO2 SERPL-SCNC: 26 MMOL/L
COLOR: YELLOW
CREAT SERPL-MCNC: 1.36 MG/DL
CREAT SERPL-MCNC: 1.52 MG/DL
CREAT SERPL-MCNC: 1.52 MG/DL
EGFR: 42 ML/MIN/1.73M2
EGFR: 48 ML/MIN/1.73M2
EOSINOPHIL # BLD AUTO: 0.32 K/UL
EOSINOPHIL # BLD AUTO: 0.73 K/UL
EOSINOPHIL NFR BLD AUTO: 3.2 %
EOSINOPHIL NFR BLD AUTO: 7.5 %
ESTIMATED AVERAGE GLUCOSE: 146 MG/DL
FERRITIN SERPL-MCNC: 46 NG/ML
FOLATE SERPL-MCNC: 9.3 NG/ML
GLUCOSE QUALITATIVE U: NEGATIVE
GLUCOSE SERPL-MCNC: 112 MG/DL
GLUCOSE SERPL-MCNC: 114 MG/DL
GLUCOSE SERPL-MCNC: 122 MG/DL
HBA1C MFR BLD HPLC: 6.7 %
HCG SERPL-MCNC: <1 MIU/ML
HCT VFR BLD CALC: 37.1 %
HCT VFR BLD CALC: 38.2 %
HDLC SERPL-MCNC: 38 MG/DL
HGB BLD-MCNC: 10.8 G/DL
HGB BLD-MCNC: 11.2 G/DL
HYALINE CASTS: 5 /LPF
IMM GRANULOCYTES NFR BLD AUTO: 0.2 %
IMM GRANULOCYTES NFR BLD AUTO: 0.4 %
INR PPP: 1.07 RATIO
IRON SATN MFR SERPL: 13 %
IRON SERPL-MCNC: 45 UG/DL
KETONES URINE: NEGATIVE
LDLC SERPL CALC-MCNC: 72 MG/DL
LEUKOCYTE ESTERASE URINE: NEGATIVE
LPL SERPL-CCNC: 11 U/L
LYMPHOCYTES # BLD AUTO: 3.41 K/UL
LYMPHOCYTES # BLD AUTO: 3.89 K/UL
LYMPHOCYTES NFR BLD AUTO: 34.8 %
LYMPHOCYTES NFR BLD AUTO: 39 %
MAN DIFF?: NORMAL
MAN DIFF?: NORMAL
MCHC RBC-ENTMCNC: 23.8 PG
MCHC RBC-ENTMCNC: 24.4 PG
MCHC RBC-ENTMCNC: 29.1 GM/DL
MCHC RBC-ENTMCNC: 29.3 GM/DL
MCV RBC AUTO: 81.9 FL
MCV RBC AUTO: 83.2 FL
MICROSCOPIC-UA: NORMAL
MONOCYTES # BLD AUTO: 0.43 K/UL
MONOCYTES # BLD AUTO: 0.46 K/UL
MONOCYTES NFR BLD AUTO: 4.3 %
MONOCYTES NFR BLD AUTO: 4.7 %
NEUTROPHILS # BLD AUTO: 5.12 K/UL
NEUTROPHILS # BLD AUTO: 5.25 K/UL
NEUTROPHILS NFR BLD AUTO: 52.3 %
NEUTROPHILS NFR BLD AUTO: 52.7 %
NITRITE URINE: NEGATIVE
NONHDLC SERPL-MCNC: 117 MG/DL
NT-PROBNP SERPL-MCNC: 149 PG/ML
NT-PROBNP SERPL-MCNC: 189 PG/ML
NT-PROBNP SERPL-MCNC: 233 PG/ML
PH URINE: 5.5
PLATELET # BLD AUTO: 317 K/UL
PLATELET # BLD AUTO: 416 K/UL
POTASSIUM SERPL-SCNC: 3.7 MMOL/L
POTASSIUM SERPL-SCNC: 4 MMOL/L
POTASSIUM SERPL-SCNC: 4.3 MMOL/L
PROT SERPL-MCNC: 7.3 G/DL
PROT SERPL-MCNC: 8.2 G/DL
PROTEIN URINE: NORMAL
PT BLD: 12.4 SEC
RBC # BLD: 4.53 M/UL
RBC # BLD: 4.59 M/UL
RBC # FLD: 16.8 %
RBC # FLD: 16.9 %
RED BLOOD CELLS URINE: 1 /HPF
SODIUM SERPL-SCNC: 140 MMOL/L
SODIUM SERPL-SCNC: 141 MMOL/L
SODIUM SERPL-SCNC: 143 MMOL/L
SPECIFIC GRAVITY URINE: 1.02
SQUAMOUS EPITHELIAL CELLS: 1 /HPF
TIBC SERPL-MCNC: 352 UG/DL
TRIGL SERPL-MCNC: 225 MG/DL
TSH SERPL-ACNC: 1.9 UIU/ML
UIBC SERPL-MCNC: 308 UG/DL
URATE SERPL-MCNC: 7.8 MG/DL
UROBILINOGEN URINE: NORMAL
VIT B12 SERPL-MCNC: 257 PG/ML
VIT B12 SERPL-MCNC: 377 PG/ML
WBC # FLD AUTO: 9.79 K/UL
WBC # FLD AUTO: 9.97 K/UL
WHITE BLOOD CELLS URINE: 0 /HPF

## 2022-04-18 ENCOUNTER — APPOINTMENT (OUTPATIENT)
Dept: PULMONOLOGY | Facility: CLINIC | Age: 47
End: 2022-04-18
Payer: COMMERCIAL

## 2022-04-18 VITALS
SYSTOLIC BLOOD PRESSURE: 141 MMHG | HEART RATE: 100 BPM | DIASTOLIC BLOOD PRESSURE: 83 MMHG | TEMPERATURE: 98 F | OXYGEN SATURATION: 99 % | HEIGHT: 64 IN | BODY MASS INDEX: 49.34 KG/M2 | WEIGHT: 289 LBS

## 2022-04-18 PROCEDURE — 99214 OFFICE O/P EST MOD 30 MIN: CPT

## 2022-04-18 NOTE — DISCUSSION/SUMMARY
[FreeTextEntry1] : Obesity.\par Mild obstructive sleep apnea syndrome relatively asymptomatic.  Treatment options discussed.  For bariatric surgery and weight loss.  No definitive need to treat with CPAP prior to surgery.\par Status post COVID without significant respiratory symptomatology.  Normal lung function.\par Preop for bariatric surgery.

## 2022-04-18 NOTE — PROCEDURE
[FreeTextEntry1] : [place] is 9.\par \par Pulmonary function testing on March 21, 2022.\par FEV1, FVC, and FEV1/FVC are within normal limits. TLC and subdivisions are normal. RV/TLC ratio is normal. Single breath diffusion capacity is normal. \par \par \par Home sleep study reveals mild sleep apnea with AHI of 6 and RDI of 17.  No significant desaturation.

## 2022-04-18 NOTE — HISTORY OF PRESENT ILLNESS
[Current] : current [< 20 pack-years] : < 20 pack-years [Awakes Unrefreshed] : awakes unrefreshed [Awakes with Dry Mouth] : awakes with dry mouth [Daytime Somnolence] : daytime somnolence [Difficulty Maintaining Sleep] : difficulty maintaining sleep [Frequent Nocturnal Awakening] : frequent nocturnal awakening [Snoring] : snoring [TextBox_4] : Never used Chantix, got letter recalled\par stopped smoking on own 2 weeks ago\par No significant cough, wheezing, chest pain or SOB.\par had 2 night HHS here for result\par no daytime sleepiness\par \par \par \par Patient sent by Lucas Stone for pulmonary Consult for bariatric sleeve, potentially scheduled for July\par \par seeing Dr Crawford for cardio\par had recent CXR 2/2022\par  \par  [TextBox_11] : .5 [TextBox_13] : 20 [Awakes with Headache] : does not awaken with headache [Recent  Weight Gain] : no recent weight gain [Witnessed Apneas] : no witnessed apneas

## 2022-04-18 NOTE — PHYSICAL EXAM
[Normal Oropharynx] : normal oropharynx [Normal Appearance] : normal appearance [No Neck Mass] : no neck mass [No JVD] : no jvd [Normal S1, S2] : normal s1, s2 [No Murmurs] : no murmurs [Clear to Auscultation Bilaterally] : clear to auscultation bilaterally [Normal to Percussion] : normal to percussion [Benign] : benign [No HSM] : no hsm [No Clubbing] : no clubbing [No Edema] : no edema [No Cyanosis] : no cyanosis [TextBox_2] : Overweight

## 2022-04-25 ENCOUNTER — APPOINTMENT (OUTPATIENT)
Dept: HEART FAILURE | Facility: CLINIC | Age: 47
End: 2022-04-25

## 2022-04-26 RX ORDER — PEANUT 0.5 TO 6MG
0.5 & 1 & 1.5 & KIT ORAL
Refills: 0 | Status: DISCONTINUED | COMMUNITY
Start: 2022-04-26 | End: 2022-04-26

## 2022-04-26 RX ORDER — CHOLECALCIFEROL (VITAMIN D3) 1250 MCG
1.25 MG CAPSULE ORAL
Qty: 6 | Refills: 0 | Status: DISCONTINUED | COMMUNITY
Start: 2022-03-07 | End: 2022-04-26

## 2022-04-27 ENCOUNTER — APPOINTMENT (OUTPATIENT)
Dept: HEART FAILURE | Facility: CLINIC | Age: 47
End: 2022-04-27
Payer: COMMERCIAL

## 2022-04-27 VITALS
HEART RATE: 90 BPM | TEMPERATURE: 98.2 F | DIASTOLIC BLOOD PRESSURE: 89 MMHG | BODY MASS INDEX: 49.17 KG/M2 | OXYGEN SATURATION: 100 % | HEIGHT: 64 IN | WEIGHT: 288 LBS | SYSTOLIC BLOOD PRESSURE: 138 MMHG

## 2022-04-27 DIAGNOSIS — I42.8 OTHER CARDIOMYOPATHIES: ICD-10-CM

## 2022-04-27 PROCEDURE — 99205 OFFICE O/P NEW HI 60 MIN: CPT

## 2022-04-30 PROBLEM — I42.8 NONISCHEMIC CARDIOMYOPATHY: Status: ACTIVE | Noted: 2022-04-30

## 2022-04-30 NOTE — REVIEW OF SYSTEMS
[FreeTextEntry1] : The remaining 14-point ROS is otherwise negative or non-contributory except as noted in the HPI.

## 2022-04-30 NOTE — CARDIOLOGY SUMMARY
[de-identified] : \par 3/31/22: Sinus Rhythm, nonspecific T wave abnormalities\par 3/7/22: Sinus Rhythm, Left anterior fascicular block, SRIRAM, nonspecific T wave abnormalities\par 2/14/22: Sinus Rhythm, SRIRAM, nonspecific T wave abnormalities\par  [de-identified] : 2/9/2020: LVEF 38%, small-moderate sade-apical defect suggestive of ischemia [de-identified] : 2/15/22: LVEF 35-40%, LVIDd 5.29cm, borderline concentric LV hypertrophy, grade I diastolic dysfunction, normal RV size and function, moderately enlarged LA, trace MR, mild AR\par \par 6/9/2021: LVEF 41%, LVIDd 5.45cm, borderline eccentric LV hypertrophy, grade I diastolic dysfunction, RV not well visualized, grossly normal RV, trace MR, mild AR\par \par 2/19/2020: LVEF 40%, LVIDd 5.51cm, borderline eccentric LV hypertrophy, grade I diastolic dysfunction, RV not well visualized, trace MR, mild AR [de-identified] : 2/28/2020: [facility] normal coronaries\par                   RHC: RA-15, PA-51/24/36, PCWP 20 PA sat- 58.7%; severe pHTN. [place] CO/CI 5.42/2.40

## 2022-04-30 NOTE — END OF VISIT
[Time Spent: ___ minutes] : I have spent [unfilled] minutes of time on the encounter. 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 Pt refers she feels well in general. She denies overt HF symptoms. Clinically looks euvolemic, warm extremities. She is tolerating GDMT but remains with elevated BP. It is likely that her CMP is related to uncontrolled HTN. Will continue GDMT optimization and risk factors modifications. Of note, patient is undergoing testing to undergo bariatric surgery in the near future.

## 2022-04-30 NOTE — REASON FOR VISIT
[Cardiac Failure] : cardiac failure [FreeTextEntry3] : Renato Crawford MD [FreeTextEntry1] : Cardiologist- Dr. Crawford\par \par Pulmonologist- Dr. Nichole \par \par Nephrologist- Dr. Levi\par \par Bariatric Surgeon- Dr. Diamond

## 2022-04-30 NOTE — PHYSICAL EXAM
[Well Developed] : well developed [No Acute Distress] : no acute distress [Obese] : obese [Normal Conjunctiva] : normal conjunctiva [Normal S1, S2] : normal S1, S2 [No Murmur] : no murmur [Clear Lung Fields] : clear lung fields [No Respiratory Distress] : no respiratory distress  [Soft] : abdomen soft [Normal Gait] : normal gait [No Edema] : no edema [No Rash] : no rash [Moves all extremities] : moves all extremities [No Focal Deficits] : no focal deficits [Alert and Oriented] : alert and oriented [de-identified] : JVP<6cm [de-identified] : warm and well perfused

## 2022-04-30 NOTE — HISTORY OF PRESENT ILLNESS
[FreeTextEntry1] : \par 48 y/o female with obesity (BMI 49), HTN, NICMP, HFrEF (LVEF 35-40%, LVIDd 5.29cm), mild DAMION (does not require CPAP), +COVID (5/2020), former smoker (quit 3 weeks ago, 1/2 ppd x 20 years). She states she was hospitalized in 2018 for high BP, cough, SOB/WICK and was told she had congestive heart failure. She has established care with Cardiologist Dr. Crawford since Feb 2020. \par \par  Most recent hospitalization was in 2/2022 for acute abdominal pain and cholecystitis s/p lap cholecystectomy (3/17/22). She is undergoing workup for bariatric surgery scheduled for July 2022 with Dr. Diamond. Of note, she was recently diagnosed with +Hpylori from Endoscopy (3/14) and is on Talicia for a 14 day course. She subsequently developed a yeast infection which she is currently being treated for (has tolerated Farxiga without issue).\par \par She reports she overall feels well and denies limitations in her activity. She can walk several blocks and climb a flight of stairs without issue. She states she walked from the parking garage to the office today with no issue. She sleeps with 2 pillows at night for comfort which is unchanged. She adheres to her medication regimen. She does not weigh herself daily and does not take her BP at home but states it is usually 120-130/80. She reports that she does not follow a low sodium diet and will go to [employer] for breakfast and will have a sandwich such as a BLT for lunch.\par \par She denies chest pain, palpitations, SOB, orthopnea, PND, or lower extremity swelling. She denies family history of heart disease. She denies recent hospitalization for heart failure.\par \par She is COVID vaccinated but has not yet received the booster.

## 2022-04-30 NOTE — DISCUSSION/SUMMARY
[FreeTextEntry1] : This is a 46 y/o female with HTN, HFrEF (LVEF 35-40%), morbid obesity, former smoker that presents to clinic today to establish care. Overall she denies overt HF symptoms or recent hospitalizations for heart failure. She appears euvolemic and well perfused. HFrEF likely in setting of uncontrolled HTN. We will continue to maximize GDMT and plan to follow up TTE in 3-4 months for further evaluation of LVEF.

## 2022-04-30 NOTE — ASSESSMENT
[FreeTextEntry1] : #Chronic systolic heart failure (ACC/AHA Stage C, NYHA Class II)\par - Unknown etiology (?HTNve CMP) LVEF 35-40%\par - Clinically euvolemic, warm extremities\par -GDMT: Increase Entresto to 97-103mg BID as recent labs stable and BP elevated in office today (138/89)\par             Continue Toprol  daily. Can consider switching to coreg if BP remains elevated. HR not at goal. \par             Continue Sprinolactone 25mg daily\par            -Continue Farxiga 10mg daily. Pt had yeast infection while on antibiotics. IF she has recurrent episodes may have to d/c. \par -Will obtain follow up labs 7-10 days after increasing Entresto dose\par -Pt given daily weight log and instructed to purchase a BP cuff so she can trend her BP daily\par -Pt instructed on importance of lifestyle modifications such as low sodium diet and physical activity as tolerated \par - Device: LVEF 35-40%, no clear indication. \par - HF education provided including lifestyle changes (low Na diet, fluid restriction, increase physical activity), current clinical condition, natural progression and prognosis.\par - Cardiac rehab referral\par \par #HTN\par -Continue medication regimen as above\par \par #DAMION\par -Follows with pulmonologist Dr. Nichole\par \par #Morbid obesity (BMI 49.6)\par -Pt tentatively planned for bariatric surgery with Dr. Diamond in July\par -Pt reports being seen by a nutritionist\par \par Follow up telehealth visit in 3 weeks to review lab results, BP log, and medications with plan to increase Toprol to 150mg QD. Follow up office visit in 3-4 months.

## 2022-05-09 ENCOUNTER — APPOINTMENT (OUTPATIENT)
Dept: SURGERY | Facility: CLINIC | Age: 47
End: 2022-05-09
Payer: COMMERCIAL

## 2022-05-09 VITALS
DIASTOLIC BLOOD PRESSURE: 79 MMHG | SYSTOLIC BLOOD PRESSURE: 117 MMHG | HEART RATE: 97 BPM | OXYGEN SATURATION: 96 % | WEIGHT: 288 LBS | BODY MASS INDEX: 49.17 KG/M2 | HEIGHT: 64 IN | TEMPERATURE: 97 F | RESPIRATION RATE: 18 BRPM

## 2022-05-09 PROCEDURE — 99212 OFFICE O/P EST SF 10 MIN: CPT | Mod: 24

## 2022-05-09 NOTE — ASSESSMENT
[FreeTextEntry1] : 47-year-old female with with longstanding history of morbid obesity (BMI 50) with comorbidities of hypertension, CHF, renal insufficiency, and obstructive sleep apnea who is undergoing continuing medical weight management in preparation for bariatric surgery, specifically sleeve gastrectomy.\par

## 2022-05-09 NOTE — PHYSICAL EXAM
[de-identified] : Well-appearing, no acute distress [de-identified] : Sclerae anicteric [de-identified] : Soft, nontender, nondistended.  Incisions well-healed

## 2022-05-09 NOTE — HISTORY OF PRESENT ILLNESS
[de-identified] : VALENTIN is a 47 year old female here for continuing medical weight management in preparation for planned bariatric surgery.\par No changes in medical history reported.  Continuing to moderate portion sizes and make more healthful choices.  Increasing activity levels as much as possible. \par She has recovered very well from her cholecystectomy and is asymptomatic.

## 2022-05-10 ENCOUNTER — TRANSCRIPTION ENCOUNTER (OUTPATIENT)
Age: 47
End: 2022-05-10

## 2022-05-17 ENCOUNTER — APPOINTMENT (OUTPATIENT)
Dept: CARDIOLOGY | Facility: CLINIC | Age: 47
End: 2022-05-17

## 2022-05-17 ENCOUNTER — APPOINTMENT (OUTPATIENT)
Dept: ENDOCRINOLOGY | Facility: CLINIC | Age: 47
End: 2022-05-17

## 2022-05-17 NOTE — HISTORY OF PRESENT ILLNESS
[FreeTextEntry1] : Ms. VALENTIN WOOD is a 47 year old female who presents for initial endocrine evaluation with regard to a hx of type 2 dm. The diabetes has been present for about    years She is pending bariatric procedure per Dr. Diamond.\par \par . She denies any history of retiopathy or nephropathy. With regard to neuropathy,she    . For the diabetes, she   is currently taking   She   denies polyuria, polydipsia, or any visual changes. She  too denies any skin lesions, skin breakdown or non-healing areas of skin. She  too denies any podiatric concerns. Ophthalmologic evaluation is up to date. Home glucose monitoring has shown values to be running.  She  does deny any hypoglycemia or hypoglycemic signs or symptoms.\par A1c from 2/14/2022 retuned at 6.7 %.\par \par Additional medical history includes that of  Cardiomyopathy, CHF,Smoker, hypertension, Morbid obesity, Hx of covid infection, vitamin d deficiency\par vitamin d  25-OH level from this past February was at 15.4 TFt's were wnl and serum creatinine was at 1.36.

## 2022-05-18 ENCOUNTER — RX RENEWAL (OUTPATIENT)
Age: 47
End: 2022-05-18

## 2022-05-23 ENCOUNTER — APPOINTMENT (OUTPATIENT)
Dept: HEART FAILURE | Facility: CLINIC | Age: 47
End: 2022-05-23

## 2022-05-23 ENCOUNTER — TRANSCRIPTION ENCOUNTER (OUTPATIENT)
Age: 47
End: 2022-05-23

## 2022-05-23 ENCOUNTER — APPOINTMENT (OUTPATIENT)
Dept: HEART FAILURE | Facility: CLINIC | Age: 47
End: 2022-05-23
Payer: COMMERCIAL

## 2022-05-23 PROCEDURE — 99443: CPT | Mod: 95

## 2022-05-23 RX ORDER — METOPROLOL SUCCINATE 100 MG/1
100 TABLET, EXTENDED RELEASE ORAL
Qty: 90 | Refills: 1 | Status: DISCONTINUED | COMMUNITY
Start: 1900-01-01 | End: 2022-05-23

## 2022-05-25 RX ORDER — CARVEDILOL 12.5 MG/1
12.5 TABLET, FILM COATED ORAL TWICE DAILY
Qty: 90 | Refills: 1 | Status: ACTIVE | COMMUNITY
Start: 2022-05-23 | End: 1900-01-01

## 2022-05-31 ENCOUNTER — APPOINTMENT (OUTPATIENT)
Dept: CARDIOLOGY | Facility: CLINIC | Age: 47
End: 2022-05-31
Payer: COMMERCIAL

## 2022-05-31 ENCOUNTER — NON-APPOINTMENT (OUTPATIENT)
Age: 47
End: 2022-05-31

## 2022-05-31 VITALS
BODY MASS INDEX: 49 KG/M2 | WEIGHT: 287 LBS | TEMPERATURE: 98.5 F | DIASTOLIC BLOOD PRESSURE: 73 MMHG | HEART RATE: 72 BPM | SYSTOLIC BLOOD PRESSURE: 122 MMHG | HEIGHT: 64 IN | OXYGEN SATURATION: 97 %

## 2022-05-31 PROCEDURE — 99214 OFFICE O/P EST MOD 30 MIN: CPT

## 2022-05-31 PROCEDURE — 93000 ELECTROCARDIOGRAM COMPLETE: CPT

## 2022-05-31 RX ORDER — FLUCONAZOLE 150 MG/1
150 TABLET ORAL DAILY
Qty: 3 | Refills: 0 | Status: DISCONTINUED | COMMUNITY
Start: 2022-04-25 | End: 2022-05-31

## 2022-05-31 RX ORDER — MICONAZOLE NITRATE 4 %
2 CREAM WITH PREFILLED APPLICATOR VAGINAL
Qty: 1 | Refills: 0 | Status: DISCONTINUED | COMMUNITY
Start: 2022-04-25 | End: 2022-05-31

## 2022-05-31 RX ORDER — OMEPRAZOLE MAGNESIUM, AMOXICILLIN AND RIFABUTIN 10; 250; 12.5 MG/1; MG/1; MG/1
250-12.5-1 CAPSULE, DELAYED RELEASE ORAL
Refills: 0 | Status: DISCONTINUED | COMMUNITY
Start: 2022-04-27 | End: 2022-05-31

## 2022-05-31 NOTE — HISTORY OF PRESENT ILLNESS
[FreeTextEntry1] : This is a 46yo female who returns to the office today for follow-up. Patient recently seen by HF specialist Dr. Rubio, was advised to continue on Entresto , Farxiga 10mg daily, and Spironolactone. patient states she is doing well and has no issues or concerns for today. Patient denies chest pain, shortness of breath, palpitations, dizziness, vision changes, n/v, abdominal pain, changes in bowel/bladder habits,  or appetite.

## 2022-06-10 ENCOUNTER — APPOINTMENT (OUTPATIENT)
Dept: HEART FAILURE | Facility: CLINIC | Age: 47
End: 2022-06-10

## 2022-06-13 ENCOUNTER — APPOINTMENT (OUTPATIENT)
Dept: SURGERY | Facility: CLINIC | Age: 47
End: 2022-06-13

## 2022-06-13 VITALS
DIASTOLIC BLOOD PRESSURE: 78 MMHG | RESPIRATION RATE: 18 BRPM | TEMPERATURE: 97 F | HEART RATE: 75 BPM | HEIGHT: 64 IN | SYSTOLIC BLOOD PRESSURE: 112 MMHG | OXYGEN SATURATION: 98 % | WEIGHT: 281.5 LBS | BODY MASS INDEX: 48.06 KG/M2

## 2022-06-13 PROCEDURE — 99212 OFFICE O/P EST SF 10 MIN: CPT | Mod: 24

## 2022-06-13 NOTE — PHYSICAL EXAM
[de-identified] : Well-appearing, no acute distress [de-identified] : Sclerae anicteric [de-identified] : Soft, nontender, nondistended.  Incisions well-healed

## 2022-06-13 NOTE — HISTORY OF PRESENT ILLNESS
[de-identified] : VALENTIN is a 47 year old female here for continuing medical weight management in preparation for planned bariatric surgery. S/p laparoscopic cholecystectomy on 03/17/2022.\par No changes in medical history reported.  Continuing to moderate portion sizes and make more healthful choices.  Increasing activity levels as much as possible.

## 2022-06-26 ENCOUNTER — RESULT CHARGE (OUTPATIENT)
Age: 47
End: 2022-06-26

## 2022-06-27 ENCOUNTER — APPOINTMENT (OUTPATIENT)
Dept: CARDIOLOGY | Facility: CLINIC | Age: 47
End: 2022-06-27
Payer: COMMERCIAL

## 2022-06-27 VITALS
TEMPERATURE: 98.7 F | OXYGEN SATURATION: 99 % | HEIGHT: 64 IN | HEART RATE: 81 BPM | SYSTOLIC BLOOD PRESSURE: 116 MMHG | DIASTOLIC BLOOD PRESSURE: 80 MMHG | WEIGHT: 279 LBS | BODY MASS INDEX: 47.63 KG/M2

## 2022-06-27 DIAGNOSIS — Z00.00 ENCOUNTER FOR GENERAL ADULT MEDICAL EXAMINATION W/OUT ABNORMAL FINDINGS: ICD-10-CM

## 2022-06-27 PROCEDURE — 99213 OFFICE O/P EST LOW 20 MIN: CPT

## 2022-06-27 NOTE — HISTORY OF PRESENT ILLNESS
[FreeTextEntry1] : This is a 47 year old female who presents to the office for f/u \par pt reports tolerating Coreg well Denies any dizziness light headedness. BP are more controlled\par \par pt planning for gastric sleeve surgery planned for hopefully aug with Dr. Mendez reports feeling well \par \par Pt denies any CP, SOB, heart palpitations, dizziness, abdominal pain N/V/D fever or chills\par

## 2022-06-27 NOTE — PHYSICAL EXAM

## 2022-07-05 ENCOUNTER — APPOINTMENT (OUTPATIENT)
Dept: RADIOLOGY | Facility: HOSPITAL | Age: 47
End: 2022-07-05

## 2022-07-05 ENCOUNTER — OUTPATIENT (OUTPATIENT)
Dept: OUTPATIENT SERVICES | Facility: HOSPITAL | Age: 47
LOS: 1 days | End: 2022-07-05
Payer: COMMERCIAL

## 2022-07-05 DIAGNOSIS — Z01.818 ENCOUNTER FOR OTHER PREPROCEDURAL EXAMINATION: ICD-10-CM

## 2022-07-05 DIAGNOSIS — Z00.00 ENCOUNTER FOR GENERAL ADULT MEDICAL EXAMINATION WITHOUT ABNORMAL FINDINGS: ICD-10-CM

## 2022-07-05 PROCEDURE — 74240 X-RAY XM UPR GI TRC 1CNTRST: CPT

## 2022-07-05 PROCEDURE — 74240 X-RAY XM UPR GI TRC 1CNTRST: CPT | Mod: 26

## 2022-07-08 ENCOUNTER — NON-APPOINTMENT (OUTPATIENT)
Age: 47
End: 2022-07-08

## 2022-07-08 LAB
25(OH)D3 SERPL-MCNC: 46.1 NG/ML
ALBUMIN SERPL ELPH-MCNC: 4.1 G/DL
ALP BLD-CCNC: 112 U/L
ALT SERPL-CCNC: 8 U/L
ANION GAP SERPL CALC-SCNC: 14 MMOL/L
AST SERPL-CCNC: 14 U/L
BASOPHILS # BLD AUTO: 0.04 K/UL
BASOPHILS NFR BLD AUTO: 0.5 %
BILIRUB SERPL-MCNC: 0.3 MG/DL
BUN SERPL-MCNC: 15 MG/DL
CALCIUM SERPL-MCNC: 9.6 MG/DL
CHLORIDE SERPL-SCNC: 104 MMOL/L
CHOLEST SERPL-MCNC: 171 MG/DL
CO2 SERPL-SCNC: 22 MMOL/L
CREAT SERPL-MCNC: 1.42 MG/DL
EGFR: 46 ML/MIN/1.73M2
EOSINOPHIL # BLD AUTO: 0.35 K/UL
EOSINOPHIL NFR BLD AUTO: 4.2 %
ESTIMATED AVERAGE GLUCOSE: 137 MG/DL
FERRITIN SERPL-MCNC: 49 NG/ML
FOLATE SERPL-MCNC: 7.6 NG/ML
GLUCOSE SERPL-MCNC: 81 MG/DL
HBA1C MFR BLD HPLC: 6.4 %
HCT VFR BLD CALC: 38.3 %
HDLC SERPL-MCNC: 37 MG/DL
HGB BLD-MCNC: 11.3 G/DL
IMM GRANULOCYTES NFR BLD AUTO: 0.4 %
IRON SATN MFR SERPL: 21 %
IRON SERPL-MCNC: 62 UG/DL
LDLC SERPL CALC-MCNC: 95 MG/DL
LYMPHOCYTES # BLD AUTO: 3.32 K/UL
LYMPHOCYTES NFR BLD AUTO: 39.6 %
MAN DIFF?: NORMAL
MCHC RBC-ENTMCNC: 25.4 PG
MCHC RBC-ENTMCNC: 29.5 GM/DL
MCV RBC AUTO: 86.1 FL
MONOCYTES # BLD AUTO: 0.39 K/UL
MONOCYTES NFR BLD AUTO: 4.7 %
NEUTROPHILS # BLD AUTO: 4.25 K/UL
NEUTROPHILS NFR BLD AUTO: 50.6 %
NONHDLC SERPL-MCNC: 134 MG/DL
NT-PROBNP SERPL-MCNC: 187 PG/ML
PLATELET # BLD AUTO: 321 K/UL
POTASSIUM SERPL-SCNC: 3.5 MMOL/L
PROT SERPL-MCNC: 7.6 G/DL
RBC # BLD: 4.45 M/UL
RBC # FLD: 15.8 %
SODIUM SERPL-SCNC: 140 MMOL/L
TIBC SERPL-MCNC: 291 UG/DL
TRIGL SERPL-MCNC: 192 MG/DL
UIBC SERPL-MCNC: 229 UG/DL
VIT B12 SERPL-MCNC: 268 PG/ML
WBC # FLD AUTO: 8.38 K/UL

## 2022-07-25 ENCOUNTER — APPOINTMENT (OUTPATIENT)
Dept: CARDIOLOGY | Facility: CLINIC | Age: 47
End: 2022-07-25

## 2022-07-25 ENCOUNTER — APPOINTMENT (OUTPATIENT)
Dept: SURGERY | Facility: CLINIC | Age: 47
End: 2022-07-25

## 2022-07-25 VITALS
BODY MASS INDEX: 47.63 KG/M2 | SYSTOLIC BLOOD PRESSURE: 121 MMHG | WEIGHT: 279 LBS | HEIGHT: 64 IN | OXYGEN SATURATION: 97 % | TEMPERATURE: 98.4 F | HEART RATE: 91 BPM | DIASTOLIC BLOOD PRESSURE: 80 MMHG

## 2022-07-25 VITALS
DIASTOLIC BLOOD PRESSURE: 87 MMHG | BODY MASS INDEX: 47.97 KG/M2 | HEART RATE: 83 BPM | WEIGHT: 281 LBS | SYSTOLIC BLOOD PRESSURE: 119 MMHG | RESPIRATION RATE: 18 BRPM | OXYGEN SATURATION: 99 % | HEIGHT: 64 IN | TEMPERATURE: 97.1 F

## 2022-07-25 DIAGNOSIS — E55.9 VITAMIN D DEFICIENCY, UNSPECIFIED: ICD-10-CM

## 2022-07-25 PROCEDURE — 99212 OFFICE O/P EST SF 10 MIN: CPT

## 2022-07-25 PROCEDURE — 99213 OFFICE O/P EST LOW 20 MIN: CPT

## 2022-07-25 RX ORDER — IRON POLYSACCHARIDE COMPLEX 150 MG
150 CAPSULE ORAL
Qty: 30 | Refills: 1 | Status: ACTIVE | COMMUNITY
Start: 2021-06-09 | End: 1900-01-01

## 2022-07-25 NOTE — HISTORY OF PRESENT ILLNESS
[FreeTextEntry1] : This is a 48 y/o female with a pmhx of CHF, DM, HTN, anemia, renal insufficiency here today for a follow up. pt with mild cardiomyopathy .pt pending obesity surgery pt denies any chest  pain dizziness ,lightheadedness ,nausea vomiting diaphoresis\par

## 2022-07-25 NOTE — PHYSICAL EXAM
[Normal Thyroid] : the thyroid was normal [Normal Breath Sounds] : Normal breath sounds [Normal Rate and Rhythm] : normal rate and rhythm [No HSM] : no hepatosplenomegaly [No Rash or Lesion] : No rash or lesion [Alert] : alert [Calm] : calm [Abdominal Masses] : No abdominal masses [Abdomen Tenderness] : ~T ~M No abdominal tenderness [de-identified] : Not in distress [de-identified] : AnIcteric [de-identified] : No LN [de-identified] : Soft ND NT ,CDI and well healed incisions from Lap Christine [de-identified] : deferred

## 2022-07-25 NOTE — HISTORY OF PRESENT ILLNESS
[de-identified] : VALENTIN is a 47 year old female presenting for a monthly weight check prior to bariatric surgery. \par s/p laparoscopic cholecystectomy on 03/17/22. [de-identified] : She had an EGD that did not demonstrate a Hiatal Hernia\par Her Upper GI Series does not show GERD\par She was seen by Cardiologist for her CHF \par SHe also had her PFTs to r/o DAMION.\par \par No changes in medical history reported.

## 2022-08-16 ENCOUNTER — APPOINTMENT (OUTPATIENT)
Dept: CARDIOLOGY | Facility: CLINIC | Age: 47
End: 2022-08-16

## 2022-08-17 ENCOUNTER — APPOINTMENT (OUTPATIENT)
Dept: HEART FAILURE | Facility: CLINIC | Age: 47
End: 2022-08-17

## 2022-08-25 ENCOUNTER — RX RENEWAL (OUTPATIENT)
Age: 47
End: 2022-08-25

## 2022-08-25 ENCOUNTER — APPOINTMENT (OUTPATIENT)
Dept: CARDIOLOGY | Facility: CLINIC | Age: 47
End: 2022-08-25

## 2022-08-25 ENCOUNTER — OUTPATIENT (OUTPATIENT)
Dept: OUTPATIENT SERVICES | Facility: HOSPITAL | Age: 47
LOS: 1 days | End: 2022-08-25
Payer: COMMERCIAL

## 2022-08-25 ENCOUNTER — NON-APPOINTMENT (OUTPATIENT)
Age: 47
End: 2022-08-25

## 2022-08-25 VITALS
SYSTOLIC BLOOD PRESSURE: 130 MMHG | DIASTOLIC BLOOD PRESSURE: 82 MMHG | OXYGEN SATURATION: 98 % | WEIGHT: 279 LBS | BODY MASS INDEX: 47.63 KG/M2 | HEIGHT: 64 IN | TEMPERATURE: 98.3 F | HEART RATE: 66 BPM

## 2022-08-25 VITALS
TEMPERATURE: 98 F | OXYGEN SATURATION: 100 % | HEIGHT: 64 IN | RESPIRATION RATE: 16 BRPM | HEART RATE: 78 BPM | DIASTOLIC BLOOD PRESSURE: 75 MMHG | WEIGHT: 281.09 LBS | SYSTOLIC BLOOD PRESSURE: 109 MMHG

## 2022-08-25 DIAGNOSIS — E66.01 MORBID (SEVERE) OBESITY DUE TO EXCESS CALORIES: ICD-10-CM

## 2022-08-25 DIAGNOSIS — Z90.49 ACQUIRED ABSENCE OF OTHER SPECIFIED PARTS OF DIGESTIVE TRACT: Chronic | ICD-10-CM

## 2022-08-25 DIAGNOSIS — Z29.9 ENCOUNTER FOR PROPHYLACTIC MEASURES, UNSPECIFIED: ICD-10-CM

## 2022-08-25 DIAGNOSIS — Z01.818 ENCOUNTER FOR OTHER PREPROCEDURAL EXAMINATION: ICD-10-CM

## 2022-08-25 DIAGNOSIS — G47.33 OBSTRUCTIVE SLEEP APNEA (ADULT) (PEDIATRIC): ICD-10-CM

## 2022-08-25 LAB
A1C WITH ESTIMATED AVERAGE GLUCOSE RESULT: 6.7 % — HIGH (ref 4–5.6)
ALBUMIN SERPL ELPH-MCNC: 4.3 G/DL — SIGNIFICANT CHANGE UP (ref 3.3–5)
ALP SERPL-CCNC: 125 U/L — HIGH (ref 40–120)
ALT FLD-CCNC: 9 U/L — LOW (ref 10–45)
ANION GAP SERPL CALC-SCNC: 11 MMOL/L — SIGNIFICANT CHANGE UP (ref 5–17)
AST SERPL-CCNC: 13 U/L — SIGNIFICANT CHANGE UP (ref 10–40)
BILIRUB SERPL-MCNC: 0.5 MG/DL — SIGNIFICANT CHANGE UP (ref 0.2–1.2)
BLD GP AB SCN SERPL QL: NEGATIVE — SIGNIFICANT CHANGE UP
BUN SERPL-MCNC: 15 MG/DL — SIGNIFICANT CHANGE UP (ref 7–23)
CALCIUM SERPL-MCNC: 9.1 MG/DL — SIGNIFICANT CHANGE UP (ref 8.4–10.5)
CHLORIDE SERPL-SCNC: 103 MMOL/L — SIGNIFICANT CHANGE UP (ref 96–108)
CO2 SERPL-SCNC: 23 MMOL/L — SIGNIFICANT CHANGE UP (ref 22–31)
CREAT SERPL-MCNC: 1.46 MG/DL — HIGH (ref 0.5–1.3)
EGFR: 44 ML/MIN/1.73M2 — LOW
ESTIMATED AVERAGE GLUCOSE: 146 MG/DL — HIGH (ref 68–114)
GLUCOSE SERPL-MCNC: 112 MG/DL — HIGH (ref 70–99)
HCT VFR BLD CALC: 41.5 % — SIGNIFICANT CHANGE UP (ref 34.5–45)
HGB BLD-MCNC: 12.6 G/DL — SIGNIFICANT CHANGE UP (ref 11.5–15.5)
MCHC RBC-ENTMCNC: 25.6 PG — LOW (ref 27–34)
MCHC RBC-ENTMCNC: 30.4 GM/DL — LOW (ref 32–36)
MCV RBC AUTO: 84.3 FL — SIGNIFICANT CHANGE UP (ref 80–100)
NRBC # BLD: 0 /100 WBCS — SIGNIFICANT CHANGE UP (ref 0–0)
PLATELET # BLD AUTO: 312 K/UL — SIGNIFICANT CHANGE UP (ref 150–400)
POTASSIUM SERPL-MCNC: 4 MMOL/L — SIGNIFICANT CHANGE UP (ref 3.5–5.3)
POTASSIUM SERPL-SCNC: 4 MMOL/L — SIGNIFICANT CHANGE UP (ref 3.5–5.3)
PROT SERPL-MCNC: 7.9 G/DL — SIGNIFICANT CHANGE UP (ref 6–8.3)
RBC # BLD: 4.92 M/UL — SIGNIFICANT CHANGE UP (ref 3.8–5.2)
RBC # FLD: 15.5 % — HIGH (ref 10.3–14.5)
RH IG SCN BLD-IMP: POSITIVE — SIGNIFICANT CHANGE UP
SODIUM SERPL-SCNC: 137 MMOL/L — SIGNIFICANT CHANGE UP (ref 135–145)
WBC # BLD: 7.51 K/UL — SIGNIFICANT CHANGE UP (ref 3.8–10.5)
WBC # FLD AUTO: 7.51 K/UL — SIGNIFICANT CHANGE UP (ref 3.8–10.5)

## 2022-08-25 PROCEDURE — 36415 COLL VENOUS BLD VENIPUNCTURE: CPT

## 2022-08-25 PROCEDURE — G0463: CPT

## 2022-08-25 PROCEDURE — 86850 RBC ANTIBODY SCREEN: CPT

## 2022-08-25 PROCEDURE — G0480: CPT

## 2022-08-25 PROCEDURE — 86901 BLOOD TYPING SEROLOGIC RH(D): CPT

## 2022-08-25 PROCEDURE — 85027 COMPLETE CBC AUTOMATED: CPT

## 2022-08-25 PROCEDURE — 80053 COMPREHEN METABOLIC PANEL: CPT

## 2022-08-25 PROCEDURE — 99214 OFFICE O/P EST MOD 30 MIN: CPT

## 2022-08-25 PROCEDURE — 83036 HEMOGLOBIN GLYCOSYLATED A1C: CPT

## 2022-08-25 PROCEDURE — 86900 BLOOD TYPING SEROLOGIC ABO: CPT

## 2022-08-25 PROCEDURE — 93000 ELECTROCARDIOGRAM COMPLETE: CPT

## 2022-08-25 RX ORDER — METOPROLOL TARTRATE 50 MG
1 TABLET ORAL
Qty: 0 | Refills: 0 | DISCHARGE

## 2022-08-25 RX ORDER — PANTOPRAZOLE SODIUM 20 MG/1
1 TABLET, DELAYED RELEASE ORAL
Qty: 0 | Refills: 0 | DISCHARGE

## 2022-08-25 RX ORDER — CHOLECALCIFEROL (VITAMIN D3) 125 MCG
1 CAPSULE ORAL
Qty: 0 | Refills: 0 | DISCHARGE

## 2022-08-25 RX ORDER — SACUBITRIL AND VALSARTAN 24; 26 MG/1; MG/1
1 TABLET, FILM COATED ORAL
Qty: 0 | Refills: 0 | DISCHARGE

## 2022-08-25 RX ORDER — CEFAZOLIN SODIUM 1 G
3000 VIAL (EA) INJECTION ONCE
Refills: 0 | Status: DISCONTINUED | OUTPATIENT
Start: 2022-09-08 | End: 2022-09-09

## 2022-08-25 NOTE — PHYSICAL EXAM
[General Appearance - Well Developed] : well developed [Normal Appearance] : normal appearance [Well Groomed] : well groomed [General Appearance - Well Nourished] : well nourished [No Deformities] : no deformities [General Appearance - In No Acute Distress] : no acute distress [Normal Conjunctiva] : the conjunctiva exhibited no abnormalities [Eyelids - No Xanthelasma] : the eyelids demonstrated no xanthelasmas [Normal Oral Mucosa] : normal oral mucosa [No Oral Pallor] : no oral pallor [No Oral Cyanosis] : no oral cyanosis [Normal Jugular Venous A Waves Present] : normal jugular venous A waves present [Normal Jugular Venous V Waves Present] : normal jugular venous V waves present [No Jugular Venous Clay A Waves] : no jugular venous clay A waves [Respiration, Rhythm And Depth] : normal respiratory rhythm and effort [Exaggerated Use Of Accessory Muscles For Inspiration] : no accessory muscle use [Auscultation Breath Sounds / Voice Sounds] : lungs were clear to auscultation bilaterally [Heart Rate And Rhythm] : heart rate and rhythm were normal [Heart Sounds] : normal S1 and S2 [Murmurs] : no murmurs present [Abdomen Soft] : soft [Abdomen Tenderness] : non-tender [Abdomen Mass (___ Cm)] : no abdominal mass palpated [Abnormal Walk] : normal gait [Gait - Sufficient For Exercise Testing] : the gait was sufficient for exercise testing [Nail Clubbing] : no clubbing of the fingernails [Cyanosis, Localized] : no localized cyanosis [Petechial Hemorrhages (___cm)] : no petechial hemorrhages [Skin Color & Pigmentation] : normal skin color and pigmentation [] : no rash [No Venous Stasis] : no venous stasis [Skin Lesions] : no skin lesions [No Skin Ulcers] : no skin ulcer [No Xanthoma] : no  xanthoma was observed [Oriented To Time, Place, And Person] : oriented to person, place, and time [Affect] : the affect was normal [Mood] : the mood was normal [No Anxiety] : not feeling anxious

## 2022-08-25 NOTE — H&P PST ADULT - PROBLEM SELECTOR PLAN 1
LAPAROSCOPIC SLEEVE GASTRECTOMY  Pre-op education provided - all questions answered   Chlorhex soap & instructions given  CBC, CMP, T&S, Ha1c, nicotine metabolite serum sent in PST  Hold Farxiga 3-5 days per protocol - last dose 9/4/2022  Emailed pharmacist about medications  Clear fluids up to 4 hours before arrival time at hospital G2  OR Booking notified BMI >40

## 2022-08-25 NOTE — H&P PST ADULT - NSICDXPASTMEDICALHX_GEN_ALL_CORE_FT
PAST MEDICAL HISTORY:  2019 novel coronavirus disease (COVID-19) 2020   no hospitalization - lost smell    Anemia     CHF (congestive heart failure), NYHA class II, chronic, systolic     Class 3 severe obesity with body mass index (BMI) greater than or equal to 70 in adult, unspecified obesity type, unspecified whether serious comorbidity present     Essential hypertension     Morbid obesity     Smoker     Type 2 diabetes mellitus      PAST MEDICAL HISTORY:  2019 novel coronavirus disease (COVID-19) 2020   no hospitalization - lost smell    Anemia     CHF (congestive heart failure), NYHA class II, chronic, systolic     Class 3 severe obesity with body mass index (BMI) greater than or equal to 70 in adult, unspecified obesity type, unspecified whether serious comorbidity present     Essential hypertension     History of cardiomyopathy     Morbid obesity     DAMION (obstructive sleep apnea)     Smoker     Type 2 diabetes mellitus

## 2022-08-25 NOTE — H&P PST ADULT - NS SC CAGE ALCOHOL GUILTY ABOUT
Patient Instructions by Brice Hollingsworth MD at 2019  3:00 PM     Author: Brice Hollingsworth MD Service: -- Author Type: Physician    Filed: 2019  3:37 PM Encounter Date: 2019 Status: Addendum    : Brice Hollingsworth MD (Physician)    Related Notes: Original Note by Brice Hollingsworth MD (Physician) filed at 2019  3:35 PM       Stick with soy formula for now  Will discuss vaccines at next visit.    Give Alachi 400 IU of vitamin D every day to help with healthy bone growth.  Patient Education    BRIGHT FUTURES HANDOUT- PARENT  1 MONTH VISIT  Here are some suggestions from [employer] experts that may be of value to your family.     HOW YOUR FAMILY IS DOING  If you are worried about your living or food situation, talk with us. Community agencies and programs such as [employer] and [employer] can also provide information and assistance.  Ask us for help if you have been hurt by your partner or another important person in your life. Hotlines and community agencies can also provide confidential help.  Tobacco-free spaces keep children healthy. Dont smoke or use e-cigarettes. Keep your home and car smoke-free.  Dont use alcohol or drugs.  Check your home for mold and radon. Avoid using pesticides.    FEEDING YOUR BABY  Feed your baby only breast milk or iron-fortified formula until she is about 6 months old.  Avoid feeding your baby solid foods, juice, and water until she is about 6 months old.  Feed your baby when she is hungry. Look for her to  Put her hand to her mouth.  Suck or root.  Fuss.  Stop feeding when you see your baby is full. You can tell when she  Turns away  Closes her mouth  Relaxes her arms and hands  Know that your baby is getting enough to eat if she has more than 5 wet diapers and at least 3 soft stools each day and is gaining weight appropriately.  Burp your baby during natural feeding breaks.  Hold your baby so you can look at each other when you feed her.  Always hold the bottle. Never  prop it.  If Breastfeeding  Feed your baby on demand generally every 1 to 3 hours during the day and every 3 hours at night.  Give your baby vitamin D drops (400 IU a day).  Continue to take your prenatal vitamin with iron.  Eat a healthy diet.  If Formula Feeding  Always prepare, heat, and store formula safely. If you need help, ask us.  Feed your baby 24 to 27 oz of formula a day. If your baby is still hungry, you can feed her more.    HOW YOU ARE FEELING  Take care of yourself so you have the energy to care for your baby. Remember to go for your post-birth checkup.  If you feel sad or very tired for more than a few days, let us know or call someone you trust for help.  Find time for yourself and your partner.    CARING FOR YOUR BABY  Hold and cuddle your baby often.  Enjoy playtime with your baby. Put him on his tummy for a few minutes at a time when he is awake.  Never leave him alone on his tummy or use tummy time for sleep.  When your baby is crying, comfort him by talking to, patting, stroking, and rocking him. Consider offering him a pacifier.  Never hit or shake your baby.  Take his temperature rectally, not by ear or skin. A fever is a rectal temperature of 100.4 F/38.0 C or higher. Call our office if you have any questions or concerns.  Wash your hands often.    SAFETY  Use a rear-facing-only car safety seat in the back seat of all vehicles.  Never put your baby in the front seat of a vehicle that has a passenger airbag.  Make sure your baby always stays in her car safety seat during travel. If she becomes fussy or needs to feed, stop the vehicle and take her out of her seat.  Your babys safety depends on you. Always wear your lap and shoulder seat belt. Never drive after drinking alcohol or using drugs. Never text or use a cell phone while driving.  Always put your baby to sleep on her back in her own crib, not in your bed.  Your baby should sleep in your room until she is at least 6 months old.  Make  sure your babys crib or sleep surface meets the most recent safety guidelines.  Dont put soft objects and loose bedding such as blankets, pillows, bumper pads, and toys in the crib.  If you choose to use a mesh playpen, get one made after February 28, 2013.  Keep hanging cords or strings away from your baby. Dont let your baby wear necklaces or bracelets.  Always keep a hand on your baby when changing diapers or clothing on a changing table, couch, or bed.  Learn infant CPR. Know emergency numbers. Prepare for disasters or other unexpected events by having an emergency plan.    WHAT TO EXPECT AT YOUR BABYS 2 MONTH VISIT  We will talk about  Taking care of your baby, your family, and yourself  Getting back to work or school and finding   Getting to know your baby  Feeding your baby  Keeping your baby safe at home and in the car    Helpful Resources: Smoking Quit Line: 307.848.8206  Poison Help Line:  699.859.2606  Information About Car Safety Seats: www.safercar.gov/parents  Toll-free Auto Safety Hotline: 820.765.1099  Consistent with Bright Futures: Guidelines for Health Supervision of Infants, Children, and Adolescents, 4th Edition  For more information, go to https://brightfutures.aap.org.               no

## 2022-08-25 NOTE — H&P PST ADULT - NSANTHOSAYNRD_GEN_A_CORE
No. DAMION screening performed.  STOP BANG Legend: 0-2 = LOW Risk; 3-4 = INTERMEDIATE Risk; 5-8 = HIGH Risk Yes

## 2022-08-25 NOTE — H&P PST ADULT - NSICDXPASTSURGICALHX_GEN_ALL_CORE_FT
PAST SURGICAL HISTORY:  History of cholecystectomy      PAST SURGICAL HISTORY:  History of cholecystectomy 3/2022

## 2022-08-25 NOTE — H&P PST ADULT - HISTORY OF PRESENT ILLNESS
47F with PMHx morbid obesity, hypertension, renal insufficiency, CHF with EF 38 to 40%, obstructive sleep apnea, gallstones presenting with acute onset RUQ pain beginning early this morning. Patient reports this pain is similar to the biliary colic she experiences in setting of known history of gallstones. Reports some associated nausea. Denies fever/chills, vomiting, diarrhea/constipation. Last had PO intake yesterday evening, having normal GI function. Pain controlled after receiving morphine in ED. Follows with Dr. Diamond, currently scheduled for elective laparoscopic cholecystectomy on 3/23/22, underwent pre-operative clearance with Dr. Crawford on 3/7/22.     In ED, patient hemodynamically stable, afebrile, WBC 8, SCr 1.5 (1.25 from 2020), Tbili 0.4, , RUQ sono showing cholelithiasis and a nonmobile stone in the neck of the gallbladder.  46 YO F PMH HTN, HFrEF (LVEF 35-40%), cardiomyopathy, DM, anemia, renal insufficiency, current some day smoker, morbid obesity,   47F with PMHx morbid obesity, hypertension, renal insufficiency, CHF with EF 38 to 40%, obstructive sleep apnea, gallstones presenting with acute onset RUQ pain beginning early this morning. Patient reports this pain is similar to the biliary colic she experiences in setting of known history of gallstones. Reports some associated nausea. Denies fever/chills, vomiting, diarrhea/constipation. Last had PO intake yesterday evening, having normal GI function. Pain controlled after receiving morphine in ED. Follows with Dr. Daimond, currently scheduled for elective laparoscopic cholecystectomy on 3/23/22, underwent pre-operative clearance with Dr. Crawford on 3/7/22.     In ED, patient hemodynamically stable, afebrile, WBC 8, SCr 1.5 (1.25 from 2020), Tbili 0.4, , RUQ sono showing cholelithiasis and a nonmobile stone in the neck of the gallbladder.  48 YO F PMH HTN, HFrEF (LVEF 35-40%), cardiomyopathy, DM, anemia, mild DAMION (no treatment), renal insufficiency, current some day smoker, morbid obesity (BMI 48.2), presents to PST for LAPAROSCOPIC SLEEVE GASTRECTOMY 9/8/2022. Denies any palpitations, SOB, N/V, Covid symptoms (fever, chills, cough) or exposure.     ***Covid test scheduled for 9/5/2022 at [facility].

## 2022-08-25 NOTE — HISTORY OF PRESENT ILLNESS
[Preoperative Visit] : for a medical evaluation prior to surgery [Scheduled Procedure ___] : a [unfilled] [Date of Surgery ___] : on [unfilled] [Surgeon Name ___] : surgeon: [unfilled] [Good] : Good [Diabetes] : diabetes [Cardiovascular Disease] : cardiovascular disease [Anti-Platelet Agents] : anti-platelet agents [Renal Disease] : renal disease [Frequent Aspirin Use] : frequent aspirin use [Prior Anesthesia] : Prior anesthesia [Electrocardiogram] : ~T an ECG ~C was performed [Echocardiogram] : ~T an echocardiogram ~C was performed [Fever] : no fever [Chills] : no chills [Fatigue] : no fatigue [Chest Pain] : no chest pain [Cough] : no cough [Dyspnea] : no dyspnea [Dysuria] : no dysuria [Urinary Frequency] : no urinary frequency [Nausea] : no nausea [Vomiting] : no vomiting [Diarrhea] : no diarrhea [Abdominal Pain] : no abdominal pain [Easy Bruising] : no easy bruising [Lower Extremity Swelling] : no lower extremity swelling [Poor Exercise Tolerance] : no poor exercise tolerance [Pulmonary Disease] : no pulmonary disease [Nicotine Dependence] : no nicotine dependence [Alcohol Use] : no  alcohol use [GI Disease] : no gastrointestinal disease [Sleep Apnea] : no sleep apnea [Thromboembolic Problems] : no thromboembolic problems [Clotting Disorder] : no clotting disorder [Frequent use of NSAIDs] : no use of NSAIDs [Bleeding Disorder] : no bleeding disorder [Transfusion Reaction] : no transfusion reaction [Impaired Immunity] : no impaired immunity [Steroid Use in Last 6 Months] : no steroid use in the last six months [Prev Anesthesia Reaction] : no previous anesthesia reaction [FreeTextEntry1] : This is a 47 year female with a Pmhx of CHF, DM, HTN, anemia, renal insufficiency here today for medical clearance for gastric Sleeve with Dr Mendez on 9/8/22\par \par Pt denies any CP, SOB, heart palpitations, dizziness, abdominal pain N/V/D fever or chills\par

## 2022-08-25 NOTE — H&P PST ADULT - FALL HARM RISK - UNIVERSAL INTERVENTIONS
Bed in lowest position, wheels locked, appropriate side rails in place/Instruct patient to call for assistance before getting out of bed or chair/Non-slip footwear when patient is out of bed/[place] to call system/Physically safe environment - no spills, clutter or unnecessary equipment/Purposeful Proactive Rounding/Room/bathroom lighting operational, light cord in reach

## 2022-08-25 NOTE — H&P PST ADULT - ASSESSMENT
FRANKI VTE 2.0 SCORE [CLOT updated 2019]    AGE RELATED RISK FACTORS                                                       MOBILITY RELATED FACTORS  [ ] Age 41-60 years                                            (1 Point)                    [ ] Bed rest                                                        (1 Point)  [ ] Age: 61-74 years                                           (2 Points)                  [ ] Plaster cast                                                   (2 Points)  [ ] Age= 75 years                                              (3 Points)                    [ ] Bed bound for more than 72 hours                 (2 Points)    DISEASE RELATED RISK FACTORS                                               GENDER SPECIFIC FACTORS  [ ] Edema in the lower extremities                       (1 Point)              [ ] Pregnancy                                                     (1 Point)  [ ] Varicose veins                                               (1 Point)                     [ ] Post-partum < 6 weeks                                   (1 Point)             [ ] BMI > 25 Kg/m2                                            (1 Point)                     [ ] Hormonal therapy  or oral contraception          (1 Point)                 [ ] Sepsis (in the previous month)                        (1 Point)               [ ] History of pregnancy complications                 (1 point)  [ ] Pneumonia or serious lung disease                                               [ ] Unexplained or recurrent                     (1 Point)           (in the previous month)                               (1 Point)  [ ] Abnormal pulmonary function test                     (1 Point)                 SURGERY RELATED RISK FACTORS  [ ] Acute myocardial infarction                              (1 Point)               [ ]  Section                                             (1 Point)  [ ] Congestive heart failure (in the previous month)  (1 Point)      [ ] Minor surgery                                                  (1 Point)   [ ] Inflammatory bowel disease                             (1 Point)               [ ] Arthroscopic surgery                                        (2 Points)  [ ] Central venous access                                      (2 Points)                [ ] General surgery lasting more than 45 minutes (2 points)  [ ] Malignancy- Present or previous                   (2 Points)                [ ] Elective arthroplasty                                         (5 points)    [ ] Stroke (in the previous month)                          (5 Points)                                                                                                                                                           HEMATOLOGY RELATED FACTORS                                                 TRAUMA RELATED RISK FACTORS  [ ] Prior episodes of VTE                                     (3 Points)                [ ] Fracture of the hip, pelvis, or leg                       (5 Points)  [ ] Positive family history for VTE                         (3 Points)             [ ] Acute spinal cord injury (in the previous month)  (5 Points)  [ ] Prothrombin 24284 A                                     (3 Points)               [ ] Paralysis  (less than 1 month)                             (5 Points)  [ ] Factor V Leiden                                             (3 Points)                  [ ] Multiple Trauma within 1 month                        (5 Points)  [ ] Lupus anticoagulants                                     (3 Points)                                                           [ ] Anticardiolipin antibodies                               (3 Points)                                                       [ ] High homocysteine in the blood                      (3 Points)                                             [ ] Other congenital or acquired thrombophilia      (3 Points)                                                [ ] Heparin induced thrombocytopenia                  (3 Points)                                     Total Score [          ] FRANKI VTE 2.0 SCORE [CLOT updated 2019]    AGE RELATED RISK FACTORS                                                       MOBILITY RELATED FACTORS  [ 1] Age 41-60 years                                            (1 Point)                    [ ] Bed rest                                                        (1 Point)  [ ] Age: 61-74 years                                           (2 Points)                  [ ] Plaster cast                                                   (2 Points)  [ ] Age= 75 years                                              (3 Points)                    [ ] Bed bound for more than 72 hours                 (2 Points)    DISEASE RELATED RISK FACTORS                                               GENDER SPECIFIC FACTORS  [ ] Edema in the lower extremities                       (1 Point)              [ ] Pregnancy                                                     (1 Point)  [ ] Varicose veins                                               (1 Point)                     [ ] Post-partum < 6 weeks                                   (1 Point)             [ 1] BMI > 25 Kg/m2                                            (1 Point)                     [ ] Hormonal therapy  or oral contraception          (1 Point)                 [ ] Sepsis (in the previous month)                        (1 Point)               [ ] History of pregnancy complications                 (1 point)  [ ] Pneumonia or serious lung disease                                               [ ] Unexplained or recurrent                     (1 Point)           (in the previous month)                               (1 Point)  [ ] Abnormal pulmonary function test                     (1 Point)                 SURGERY RELATED RISK FACTORS  [ ] Acute myocardial infarction                              (1 Point)               [ ]  Section                                             (1 Point)  [ ] Congestive heart failure (in the previous month)  (1 Point)      [ ] Minor surgery                                                  (1 Point)   [ ] Inflammatory bowel disease                             (1 Point)               [ ] Arthroscopic surgery                                        (2 Points)  [ ] Central venous access                                      (2 Points)                [2 ] General surgery lasting more than 45 minutes (2 points)  [ ] Malignancy- Present or previous                   (2 Points)                [ ] Elective arthroplasty                                         (5 points)    [ ] Stroke (in the previous month)                          (5 Points)                                                                                                                                                           HEMATOLOGY RELATED FACTORS                                                 TRAUMA RELATED RISK FACTORS  [ ] Prior episodes of VTE                                     (3 Points)                [ ] Fracture of the hip, pelvis, or leg                       (5 Points)  [ ] Positive family history for VTE                         (3 Points)             [ ] Acute spinal cord injury (in the previous month)  (5 Points)  [ ] Prothrombin 53500 A                                     (3 Points)               [ ] Paralysis  (less than 1 month)                             (5 Points)  [ ] Factor V Leiden                                             (3 Points)                  [ ] Multiple Trauma within 1 month                        (5 Points)  [ ] Lupus anticoagulants                                     (3 Points)                                                           [ ] Anticardiolipin antibodies                               (3 Points)                                                       [ ] High homocysteine in the blood                      (3 Points)                                             [ ] Other congenital or acquired thrombophilia      (3 Points)                                                [ ] Heparin induced thrombocytopenia                  (3 Points)                                     Total Score [    4      ]

## 2022-08-25 NOTE — H&P PST ADULT - NSANTHNECKRD_ENT_A_CORE
Medication history complete,m revieweed with patient's aid at beside and confirmed with ata Medication history complete, reviewed with patient and confirmed with Jesse Yes > 16 inches

## 2022-08-26 PROBLEM — G47.33 OBSTRUCTIVE SLEEP APNEA (ADULT) (PEDIATRIC): Chronic | Status: ACTIVE | Noted: 2022-08-25

## 2022-08-26 PROBLEM — U07.1 COVID-19: Chronic | Status: ACTIVE | Noted: 2022-08-25

## 2022-08-26 PROBLEM — D64.9 ANEMIA, UNSPECIFIED: Chronic | Status: ACTIVE | Noted: 2022-08-25

## 2022-08-26 PROBLEM — Z86.79 PERSONAL HISTORY OF OTHER DISEASES OF THE CIRCULATORY SYSTEM: Chronic | Status: ACTIVE | Noted: 2022-08-25

## 2022-08-26 PROBLEM — E66.01 MORBID (SEVERE) OBESITY DUE TO EXCESS CALORIES: Chronic | Status: ACTIVE | Noted: 2022-08-25

## 2022-08-26 PROBLEM — E11.9 TYPE 2 DIABETES MELLITUS WITHOUT COMPLICATIONS: Chronic | Status: ACTIVE | Noted: 2022-08-25

## 2022-08-28 LAB
COTINE: 203.7 NG/ML — SIGNIFICANT CHANGE UP
COTININE SERPL-MCNC: 8.1 NG/ML — SIGNIFICANT CHANGE UP

## 2022-08-29 ENCOUNTER — APPOINTMENT (OUTPATIENT)
Dept: SURGERY | Facility: CLINIC | Age: 47
End: 2022-08-29

## 2022-08-29 VITALS
RESPIRATION RATE: 18 BRPM | WEIGHT: 277.31 LBS | HEIGHT: 64 IN | OXYGEN SATURATION: 98 % | SYSTOLIC BLOOD PRESSURE: 121 MMHG | BODY MASS INDEX: 47.34 KG/M2 | DIASTOLIC BLOOD PRESSURE: 76 MMHG | HEART RATE: 92 BPM | TEMPERATURE: 97.9 F

## 2022-08-29 PROCEDURE — 99212 OFFICE O/P EST SF 10 MIN: CPT

## 2022-08-30 ENCOUNTER — APPOINTMENT (OUTPATIENT)
Dept: CARDIOLOGY | Facility: CLINIC | Age: 47
End: 2022-08-30

## 2022-08-30 PROCEDURE — A9500: CPT

## 2022-08-30 PROCEDURE — 93015 CV STRESS TEST SUPVJ I&R: CPT

## 2022-08-30 PROCEDURE — 78452 HT MUSCLE IMAGE SPECT MULT: CPT

## 2022-09-01 ENCOUNTER — NON-APPOINTMENT (OUTPATIENT)
Age: 47
End: 2022-09-01

## 2022-09-04 ENCOUNTER — RX RENEWAL (OUTPATIENT)
Age: 47
End: 2022-09-04

## 2022-09-05 ENCOUNTER — OUTPATIENT (OUTPATIENT)
Dept: OUTPATIENT SERVICES | Facility: HOSPITAL | Age: 47
LOS: 1 days | End: 2022-09-05
Payer: COMMERCIAL

## 2022-09-05 DIAGNOSIS — Z11.52 ENCOUNTER FOR SCREENING FOR COVID-19: ICD-10-CM

## 2022-09-05 DIAGNOSIS — Z90.49 ACQUIRED ABSENCE OF OTHER SPECIFIED PARTS OF DIGESTIVE TRACT: Chronic | ICD-10-CM

## 2022-09-05 LAB — SARS-COV-2 RNA SPEC QL NAA+PROBE: SIGNIFICANT CHANGE UP

## 2022-09-05 PROCEDURE — C9803: CPT

## 2022-09-05 PROCEDURE — U0003: CPT

## 2022-09-05 PROCEDURE — U0005: CPT

## 2022-09-07 ENCOUNTER — TRANSCRIPTION ENCOUNTER (OUTPATIENT)
Age: 47
End: 2022-09-07

## 2022-09-08 ENCOUNTER — INPATIENT (INPATIENT)
Facility: HOSPITAL | Age: 47
LOS: 0 days | Discharge: HOME | DRG: 620 | End: 2022-09-09
Attending: SURGERY | Admitting: SURGERY
Payer: COMMERCIAL

## 2022-09-08 ENCOUNTER — RESULT REVIEW (OUTPATIENT)
Age: 47
End: 2022-09-08

## 2022-09-08 ENCOUNTER — APPOINTMENT (OUTPATIENT)
Dept: SURGERY | Facility: HOSPITAL | Age: 47
End: 2022-09-08

## 2022-09-08 VITALS
WEIGHT: 281.09 LBS | HEIGHT: 64 IN | HEART RATE: 77 BPM | SYSTOLIC BLOOD PRESSURE: 112 MMHG | TEMPERATURE: 99 F | RESPIRATION RATE: 18 BRPM | DIASTOLIC BLOOD PRESSURE: 68 MMHG

## 2022-09-08 DIAGNOSIS — E66.01 MORBID (SEVERE) OBESITY DUE TO EXCESS CALORIES: ICD-10-CM

## 2022-09-08 DIAGNOSIS — Z90.49 ACQUIRED ABSENCE OF OTHER SPECIFIED PARTS OF DIGESTIVE TRACT: Chronic | ICD-10-CM

## 2022-09-08 LAB
ANION GAP SERPL CALC-SCNC: 11 MMOL/L — SIGNIFICANT CHANGE UP (ref 5–17)
BASOPHILS # BLD AUTO: 0.02 K/UL — SIGNIFICANT CHANGE UP (ref 0–0.2)
BASOPHILS NFR BLD AUTO: 0.2 % — SIGNIFICANT CHANGE UP (ref 0–2)
BUN SERPL-MCNC: 28 MG/DL — HIGH (ref 7–23)
CALCIUM SERPL-MCNC: 9 MG/DL — SIGNIFICANT CHANGE UP (ref 8.4–10.5)
CHLORIDE SERPL-SCNC: 103 MMOL/L — SIGNIFICANT CHANGE UP (ref 96–108)
CK MB CFR SERPL CALC: 1.2 NG/ML — SIGNIFICANT CHANGE UP (ref 0–3.8)
CK SERPL-CCNC: 94 U/L — SIGNIFICANT CHANGE UP (ref 25–170)
CO2 SERPL-SCNC: 21 MMOL/L — LOW (ref 22–31)
CREAT SERPL-MCNC: 2.07 MG/DL — HIGH (ref 0.5–1.3)
EGFR: 29 ML/MIN/1.73M2 — LOW
EOSINOPHIL # BLD AUTO: 0.09 K/UL — SIGNIFICANT CHANGE UP (ref 0–0.5)
EOSINOPHIL NFR BLD AUTO: 1.1 % — SIGNIFICANT CHANGE UP (ref 0–6)
GLUCOSE BLDC GLUCOMTR-MCNC: 106 MG/DL — HIGH (ref 70–99)
GLUCOSE BLDC GLUCOMTR-MCNC: 143 MG/DL — HIGH (ref 70–99)
GLUCOSE SERPL-MCNC: 110 MG/DL — HIGH (ref 70–99)
HCG UR QL: NEGATIVE — SIGNIFICANT CHANGE UP
HCT VFR BLD CALC: 35.3 % — SIGNIFICANT CHANGE UP (ref 34.5–45)
HGB BLD-MCNC: 11 G/DL — LOW (ref 11.5–15.5)
IMM GRANULOCYTES NFR BLD AUTO: 0.5 % — SIGNIFICANT CHANGE UP (ref 0–1.5)
LYMPHOCYTES # BLD AUTO: 1.94 K/UL — SIGNIFICANT CHANGE UP (ref 1–3.3)
LYMPHOCYTES # BLD AUTO: 22.9 % — SIGNIFICANT CHANGE UP (ref 13–44)
MAGNESIUM SERPL-MCNC: 2.2 MG/DL — SIGNIFICANT CHANGE UP (ref 1.6–2.6)
MCHC RBC-ENTMCNC: 26.4 PG — LOW (ref 27–34)
MCHC RBC-ENTMCNC: 31.2 GM/DL — LOW (ref 32–36)
MCV RBC AUTO: 84.9 FL — SIGNIFICANT CHANGE UP (ref 80–100)
MONOCYTES # BLD AUTO: 0.29 K/UL — SIGNIFICANT CHANGE UP (ref 0–0.9)
MONOCYTES NFR BLD AUTO: 3.4 % — SIGNIFICANT CHANGE UP (ref 2–14)
NEUTROPHILS # BLD AUTO: 6.11 K/UL — SIGNIFICANT CHANGE UP (ref 1.8–7.4)
NEUTROPHILS NFR BLD AUTO: 71.9 % — SIGNIFICANT CHANGE UP (ref 43–77)
NRBC # BLD: 0 /100 WBCS — SIGNIFICANT CHANGE UP (ref 0–0)
PHOSPHATE SERPL-MCNC: 3 MG/DL — SIGNIFICANT CHANGE UP (ref 2.5–4.5)
PLATELET # BLD AUTO: 262 K/UL — SIGNIFICANT CHANGE UP (ref 150–400)
POTASSIUM SERPL-MCNC: 5 MMOL/L — SIGNIFICANT CHANGE UP (ref 3.5–5.3)
POTASSIUM SERPL-SCNC: 5 MMOL/L — SIGNIFICANT CHANGE UP (ref 3.5–5.3)
RBC # BLD: 4.16 M/UL — SIGNIFICANT CHANGE UP (ref 3.8–5.2)
RBC # FLD: 15.1 % — HIGH (ref 10.3–14.5)
SODIUM SERPL-SCNC: 135 MMOL/L — SIGNIFICANT CHANGE UP (ref 135–145)
TROPONIN T, HIGH SENSITIVITY RESULT: 11 NG/L — SIGNIFICANT CHANGE UP (ref 0–51)
WBC # BLD: 8.49 K/UL — SIGNIFICANT CHANGE UP (ref 3.8–10.5)
WBC # FLD AUTO: 8.49 K/UL — SIGNIFICANT CHANGE UP (ref 3.8–10.5)

## 2022-09-08 PROCEDURE — 43775 LAP SLEEVE GASTRECTOMY: CPT

## 2022-09-08 PROCEDURE — 88307 TISSUE EXAM BY PATHOLOGIST: CPT | Mod: 26

## 2022-09-08 PROCEDURE — 93010 ELECTROCARDIOGRAM REPORT: CPT

## 2022-09-08 DEVICE — CLIP APPLIER COVIDIEN ENDOCLIP III 5MM: Type: IMPLANTABLE DEVICE | Status: FUNCTIONAL

## 2022-09-08 DEVICE — STAPLER COVIDIEN TRI-STAPLE 45MM PURPLE RELOAD: Type: IMPLANTABLE DEVICE | Status: FUNCTIONAL

## 2022-09-08 DEVICE — STAPLER COVIDIEN TRI-STAPLE 60MM PURPLE INTELLIGENT RELOAD: Type: IMPLANTABLE DEVICE | Status: FUNCTIONAL

## 2022-09-08 DEVICE — STAPLER COVIDIEN TRI-STAPLE 60MM BLACK INTELLIGENT RELOAD: Type: IMPLANTABLE DEVICE | Status: FUNCTIONAL

## 2022-09-08 RX ORDER — HEPARIN SODIUM 5000 [USP'U]/ML
7500 INJECTION INTRAVENOUS; SUBCUTANEOUS EVERY 8 HOURS
Refills: 0 | Status: DISCONTINUED | OUTPATIENT
Start: 2022-09-08 | End: 2022-09-09

## 2022-09-08 RX ORDER — KETOROLAC TROMETHAMINE 30 MG/ML
30 SYRINGE (ML) INJECTION EVERY 6 HOURS
Refills: 0 | Status: DISCONTINUED | OUTPATIENT
Start: 2022-09-08 | End: 2022-09-08

## 2022-09-08 RX ORDER — DEXTROSE 50 % IN WATER 50 %
25 SYRINGE (ML) INTRAVENOUS ONCE
Refills: 0 | Status: DISCONTINUED | OUTPATIENT
Start: 2022-09-08 | End: 2022-09-09

## 2022-09-08 RX ORDER — LIDOCAINE HCL 20 MG/ML
0.2 VIAL (ML) INJECTION ONCE
Refills: 0 | Status: DISCONTINUED | OUTPATIENT
Start: 2022-09-08 | End: 2022-09-08

## 2022-09-08 RX ORDER — INSULIN LISPRO 100/ML
VIAL (ML) SUBCUTANEOUS
Refills: 0 | Status: DISCONTINUED | OUTPATIENT
Start: 2022-09-08 | End: 2022-09-08

## 2022-09-08 RX ORDER — SIMETHICONE 80 MG/1
80 TABLET, CHEWABLE ORAL ONCE
Refills: 0 | Status: COMPLETED | OUTPATIENT
Start: 2022-09-08 | End: 2022-09-08

## 2022-09-08 RX ORDER — DAPAGLIFLOZIN 10 MG/1
1 TABLET, FILM COATED ORAL
Qty: 0 | Refills: 0 | DISCHARGE

## 2022-09-08 RX ORDER — SODIUM CHLORIDE 9 MG/ML
1000 INJECTION, SOLUTION INTRAVENOUS
Refills: 0 | Status: DISCONTINUED | OUTPATIENT
Start: 2022-09-08 | End: 2022-09-09

## 2022-09-08 RX ORDER — FOSAPREPITANT DIMEGLUMINE 150 MG/5ML
150 INJECTION, POWDER, LYOPHILIZED, FOR SOLUTION INTRAVENOUS ONCE
Refills: 0 | Status: COMPLETED | OUTPATIENT
Start: 2022-09-08 | End: 2022-09-08

## 2022-09-08 RX ORDER — DEXTROSE 50 % IN WATER 50 %
25 SYRINGE (ML) INTRAVENOUS ONCE
Refills: 0 | Status: DISCONTINUED | OUTPATIENT
Start: 2022-09-08 | End: 2022-09-08

## 2022-09-08 RX ORDER — DEXTROSE 50 % IN WATER 50 %
15 SYRINGE (ML) INTRAVENOUS ONCE
Refills: 0 | Status: DISCONTINUED | OUTPATIENT
Start: 2022-09-08 | End: 2022-09-08

## 2022-09-08 RX ORDER — HYOSCYAMINE SULFATE 0.13 MG
0.12 TABLET ORAL EVERY 4 HOURS
Refills: 0 | Status: DISCONTINUED | OUTPATIENT
Start: 2022-09-08 | End: 2022-09-09

## 2022-09-08 RX ORDER — HYDROMORPHONE HYDROCHLORIDE 2 MG/ML
1 INJECTION INTRAMUSCULAR; INTRAVENOUS; SUBCUTANEOUS
Refills: 0 | Status: DISCONTINUED | OUTPATIENT
Start: 2022-09-08 | End: 2022-09-08

## 2022-09-08 RX ORDER — GLUCAGON INJECTION, SOLUTION 0.5 MG/.1ML
1 INJECTION, SOLUTION SUBCUTANEOUS ONCE
Refills: 0 | Status: DISCONTINUED | OUTPATIENT
Start: 2022-09-08 | End: 2022-09-09

## 2022-09-08 RX ORDER — INFLUENZA VIRUS VACCINE 15; 15; 15; 15 UG/.5ML; UG/.5ML; UG/.5ML; UG/.5ML
0.5 SUSPENSION INTRAMUSCULAR ONCE
Refills: 0 | Status: DISCONTINUED | OUTPATIENT
Start: 2022-09-08 | End: 2022-09-09

## 2022-09-08 RX ORDER — FERROUS SULFATE 325(65) MG
1 TABLET ORAL
Qty: 0 | Refills: 0 | DISCHARGE

## 2022-09-08 RX ORDER — SODIUM CHLORIDE 9 MG/ML
1000 INJECTION, SOLUTION INTRAVENOUS
Refills: 0 | Status: DISCONTINUED | OUTPATIENT
Start: 2022-09-08 | End: 2022-09-08

## 2022-09-08 RX ORDER — SODIUM CHLORIDE 9 MG/ML
3 INJECTION INTRAMUSCULAR; INTRAVENOUS; SUBCUTANEOUS EVERY 8 HOURS
Refills: 0 | Status: DISCONTINUED | OUTPATIENT
Start: 2022-09-08 | End: 2022-09-08

## 2022-09-08 RX ORDER — ACETAMINOPHEN 500 MG
1000 TABLET ORAL ONCE
Refills: 0 | Status: COMPLETED | OUTPATIENT
Start: 2022-09-08 | End: 2022-09-08

## 2022-09-08 RX ORDER — GLUCAGON INJECTION, SOLUTION 0.5 MG/.1ML
1 INJECTION, SOLUTION SUBCUTANEOUS ONCE
Refills: 0 | Status: DISCONTINUED | OUTPATIENT
Start: 2022-09-08 | End: 2022-09-08

## 2022-09-08 RX ORDER — INSULIN LISPRO 100/ML
VIAL (ML) SUBCUTANEOUS
Refills: 0 | Status: DISCONTINUED | OUTPATIENT
Start: 2022-09-08 | End: 2022-09-09

## 2022-09-08 RX ORDER — DEXTROSE 50 % IN WATER 50 %
12.5 SYRINGE (ML) INTRAVENOUS ONCE
Refills: 0 | Status: DISCONTINUED | OUTPATIENT
Start: 2022-09-08 | End: 2022-09-09

## 2022-09-08 RX ORDER — CHLORHEXIDINE GLUCONATE 213 G/1000ML
1 SOLUTION TOPICAL ONCE
Refills: 0 | Status: DISCONTINUED | OUTPATIENT
Start: 2022-09-08 | End: 2022-09-08

## 2022-09-08 RX ORDER — PANTOPRAZOLE SODIUM 20 MG/1
40 TABLET, DELAYED RELEASE ORAL EVERY 24 HOURS
Refills: 0 | Status: DISCONTINUED | OUTPATIENT
Start: 2022-09-08 | End: 2022-09-09

## 2022-09-08 RX ORDER — SACUBITRIL AND VALSARTAN 24; 26 MG/1; MG/1
1 TABLET, FILM COATED ORAL
Qty: 0 | Refills: 0 | DISCHARGE

## 2022-09-08 RX ORDER — DEXTROSE 50 % IN WATER 50 %
15 SYRINGE (ML) INTRAVENOUS ONCE
Refills: 0 | Status: DISCONTINUED | OUTPATIENT
Start: 2022-09-08 | End: 2022-09-09

## 2022-09-08 RX ORDER — FOLIC ACID 0.8 MG
1 TABLET ORAL ONCE
Refills: 0 | Status: COMPLETED | OUTPATIENT
Start: 2022-09-08 | End: 2022-09-08

## 2022-09-08 RX ORDER — ONDANSETRON 8 MG/1
4 TABLET, FILM COATED ORAL ONCE
Refills: 0 | Status: COMPLETED | OUTPATIENT
Start: 2022-09-08 | End: 2022-09-08

## 2022-09-08 RX ORDER — ACETAMINOPHEN 500 MG
1000 TABLET ORAL ONCE
Refills: 0 | Status: COMPLETED | OUTPATIENT
Start: 2022-09-09 | End: 2022-09-09

## 2022-09-08 RX ORDER — HYDROMORPHONE HYDROCHLORIDE 2 MG/ML
0.5 INJECTION INTRAMUSCULAR; INTRAVENOUS; SUBCUTANEOUS
Refills: 0 | Status: DISCONTINUED | OUTPATIENT
Start: 2022-09-08 | End: 2022-09-08

## 2022-09-08 RX ORDER — FAMOTIDINE 10 MG/ML
20 INJECTION INTRAVENOUS ONCE
Refills: 0 | Status: COMPLETED | OUTPATIENT
Start: 2022-09-08 | End: 2022-09-08

## 2022-09-08 RX ORDER — INSULIN LISPRO 100/ML
VIAL (ML) SUBCUTANEOUS AT BEDTIME
Refills: 0 | Status: DISCONTINUED | OUTPATIENT
Start: 2022-09-08 | End: 2022-09-09

## 2022-09-08 RX ORDER — DEXTROSE 50 % IN WATER 50 %
12.5 SYRINGE (ML) INTRAVENOUS ONCE
Refills: 0 | Status: DISCONTINUED | OUTPATIENT
Start: 2022-09-08 | End: 2022-09-08

## 2022-09-08 RX ORDER — THIAMINE MONONITRATE (VIT B1) 100 MG
100 TABLET ORAL ONCE
Refills: 0 | Status: COMPLETED | OUTPATIENT
Start: 2022-09-08 | End: 2022-09-08

## 2022-09-08 RX ORDER — HEPARIN SODIUM 5000 [USP'U]/ML
5000 INJECTION INTRAVENOUS; SUBCUTANEOUS ONCE
Refills: 0 | Status: COMPLETED | OUTPATIENT
Start: 2022-09-08 | End: 2022-09-08

## 2022-09-08 RX ORDER — CEFAZOLIN SODIUM 1 G
3000 VIAL (EA) INJECTION EVERY 8 HOURS
Refills: 0 | Status: COMPLETED | OUTPATIENT
Start: 2022-09-08 | End: 2022-09-09

## 2022-09-08 RX ORDER — CARVEDILOL PHOSPHATE 80 MG/1
1 CAPSULE, EXTENDED RELEASE ORAL
Qty: 0 | Refills: 0 | DISCHARGE

## 2022-09-08 RX ORDER — ONDANSETRON 8 MG/1
4 TABLET, FILM COATED ORAL EVERY 6 HOURS
Refills: 0 | Status: DISCONTINUED | OUTPATIENT
Start: 2022-09-08 | End: 2022-09-09

## 2022-09-08 RX ADMIN — SODIUM CHLORIDE 150 MILLILITER(S): 9 INJECTION, SOLUTION INTRAVENOUS at 13:23

## 2022-09-08 RX ADMIN — HEPARIN SODIUM 7500 UNIT(S): 5000 INJECTION INTRAVENOUS; SUBCUTANEOUS at 22:21

## 2022-09-08 RX ADMIN — Medication 1 MILLIGRAM(S): at 14:57

## 2022-09-08 RX ADMIN — ONDANSETRON 4 MILLIGRAM(S): 8 TABLET, FILM COATED ORAL at 17:55

## 2022-09-08 RX ADMIN — Medication 400 MILLIGRAM(S): at 23:10

## 2022-09-08 RX ADMIN — Medication 100 MILLIGRAM(S): at 13:22

## 2022-09-08 RX ADMIN — HEPARIN SODIUM 5000 UNIT(S): 5000 INJECTION INTRAVENOUS; SUBCUTANEOUS at 08:35

## 2022-09-08 RX ADMIN — Medication 1000 MILLIGRAM(S): at 18:04

## 2022-09-08 RX ADMIN — SIMETHICONE 80 MILLIGRAM(S): 80 TABLET, CHEWABLE ORAL at 13:22

## 2022-09-08 RX ADMIN — Medication 400 MILLIGRAM(S): at 17:56

## 2022-09-08 RX ADMIN — FAMOTIDINE 20 MILLIGRAM(S): 10 INJECTION INTRAVENOUS at 13:23

## 2022-09-08 RX ADMIN — HYDROMORPHONE HYDROCHLORIDE 0.5 MILLIGRAM(S): 2 INJECTION INTRAMUSCULAR; INTRAVENOUS; SUBCUTANEOUS at 13:05

## 2022-09-08 RX ADMIN — HYDROMORPHONE HYDROCHLORIDE 0.5 MILLIGRAM(S): 2 INJECTION INTRAMUSCULAR; INTRAVENOUS; SUBCUTANEOUS at 13:20

## 2022-09-08 RX ADMIN — ONDANSETRON 4 MILLIGRAM(S): 8 TABLET, FILM COATED ORAL at 23:10

## 2022-09-08 RX ADMIN — Medication 0.12 MILLIGRAM(S): at 15:05

## 2022-09-08 RX ADMIN — Medication 200 MILLIGRAM(S): at 19:34

## 2022-09-08 RX ADMIN — HYDROMORPHONE HYDROCHLORIDE 0.5 MILLIGRAM(S): 2 INJECTION INTRAMUSCULAR; INTRAVENOUS; SUBCUTANEOUS at 15:52

## 2022-09-08 RX ADMIN — FOSAPREPITANT DIMEGLUMINE 300 MILLIGRAM(S): 150 INJECTION, POWDER, LYOPHILIZED, FOR SOLUTION INTRAVENOUS at 08:32

## 2022-09-08 RX ADMIN — SIMETHICONE 80 MILLIGRAM(S): 80 TABLET, CHEWABLE ORAL at 22:25

## 2022-09-08 RX ADMIN — PANTOPRAZOLE SODIUM 40 MILLIGRAM(S): 20 TABLET, DELAYED RELEASE ORAL at 13:31

## 2022-09-08 RX ADMIN — SODIUM CHLORIDE 150 MILLILITER(S): 9 INJECTION, SOLUTION INTRAVENOUS at 15:34

## 2022-09-08 RX ADMIN — HEPARIN SODIUM 7500 UNIT(S): 5000 INJECTION INTRAVENOUS; SUBCUTANEOUS at 15:18

## 2022-09-08 RX ADMIN — SODIUM CHLORIDE 150 MILLILITER(S): 9 INJECTION, SOLUTION INTRAVENOUS at 20:02

## 2022-09-08 RX ADMIN — ONDANSETRON 4 MILLIGRAM(S): 8 TABLET, FILM COATED ORAL at 13:32

## 2022-09-08 RX ADMIN — Medication 1000 MILLIGRAM(S): at 23:25

## 2022-09-08 NOTE — CHART NOTE - NSCHARTNOTEFT_GEN_A_CORE
Post Operative Note  Patient: VALENTIN WOOD 47y (1975) Female   MRN: [id number]  Location: [facility] PACU 12  Visit: 09-08-22 Inpatient  Date: 09-08-22 @ 15:26    Procedure: S/P sleeve gastrectomy.    Subjective: Patient seen and examined post operatively. Reports "gas pain" that is pressuring on her chest. Denies nausea, vomiting, fever, chills, chest pain, SOB, cough.      Objective:  Vitals: T(F): 96.8 (09-08-22 @ 12:47), Max: 98.6 (09-08-22 @ 08:31)  HR: 67 (09-08-22 @ 15:00)  BP: 117/62 (09-08-22 @ 15:00) (109/61 - 135/77)  RR: 20 (09-08-22 @ 15:00)  SpO2: 100% (09-08-22 @ 15:00)  Vent Settings:     In:   09-08-22 @ 07:01  -  09-08-22 @ 15:26  --------------------------------------------------------  IN: 250 mL      IV Fluids: lactated ringers. 1000 milliLiter(s) (150 mL/Hr) IV Continuous <Continuous>  sodium chloride 0.9% 1000 milliLiter(s) (250 mL/Hr) IV Continuous <Continuous>      Out:   09-08-22 @ 07:01  -  09-08-22 @ 15:26  --------------------------------------------------------  OUT: 0 mL      EBL:     Voided Urine:   09-08-22 @ 07:01  -  09-08-22 @ 15:26  --------------------------------------------------------  OUT: 0 mL      Physical Examination:  General: NAD, resting comfortably in bed  HEENT: Normocephalic atraumatic  Respiratory: Nonlabored respirations, normal CW expansion.  Cardio: S1S2, regular rate and rhythm.  Abdomen: softly distended, appropriately tender, surgical incisions are intact without soaking. Abdomen is distended and hyperresonant on percussion.  Vascular: extremities are warm and well perfused.     Imaging:  No post-op imaging studies    Assessment:  47yFemale patient S/P gastric sleeve for morbid obesity. Patient complains of gas pain that is not responding to PPIx and acetaminophen. Anasthesia team did an ECG and troponins pending. Surgery team tried contacting her private cardiologist but was unsuccessful.     Plan:  - Pain control PRN  - Diet: Bariatric diet after 6-hours  - IVF: KEYON  - FAUSTO Gutierrez  - Activity: as tolerated  - DVT ppx: SCD's & heparin 7500 U  - Follow up on Troponin levels    Date/Time: 09-08-22 @ 15:26

## 2022-09-08 NOTE — PRE-ANESTHESIA EVALUATION ADULT - NSRADCARDRESULTSFT_GEN_ALL_CORE
TRANSTHORACIC    ECHOCARDIOGRAM REPORT  Patient Name:  VALENTIN WOOD  Patient Location:  [facility] Rec #:  [id number]  Accession #:  [id number]  Account #:  [id number]  Height:  64.0 in  162.6 cm  YOB: 1975  Weight:  294.0 lb  133.36 kg  Patient Age:  47 years  BSA:  2.30 m  ²  Patient Gender:  F  BP:  108  /  80   mmHg  Date of Exam:  2/15/2022  10:35:19 AM  Sonographer:  Francis Rivero RDCS, RVT  Referring Physician:  Renato Crawford  Procedure:  2D Echo/Doppler/Color Doppler Complete.  Indications:  I42.9   -   Cardiomyopathy, unspecified  Diagnosis:  I42.9   -   Cardiomyopathy, unspecified  Study Details:  Technically fair study.  Study quality was adversely affected due to body habitus.  Left Ventricle:  Normal  IVSd (2D):  1.07 cm  (0.7  -  1.1)  LVPWd (2D):  1.07 cm  (0.7  -  1.1)  LVIDd (2D):  5.29 cm  (3.4  -  5.7)  LVIDs (2D):  4.30 cm  LV FS (2D):  18.8 %  (>25%)  LV EF (2D):  38 %  (>55%)  Relative Wall Thickness  0.40  (<0.42)  Aorta/Left Atrium:  Ao Root   diam  Normal  Aortic Root (Mmode):  2.60 cm  (2.4  -  3.7)  Ao Sinus:  2.70 cm  (2.4  -  3.7)  Ao Asc diam  2.77  AoV Cusp Separation:  1.95 cm  (1.5  -  2.6)  Left Atrium (Mmode):  4.42 cm  (1.9  -  4.0)  LA Volume Index  42.6 ml/m  ²  Right Ventricle:  TAPSE:  1.70 cm  MV Peak E:  0.38 m/s  MV Peak A:  0.51 m/s  E/A Ratio:  0.75  e', MV Flower:  0.04 m/s  E/e' Ratio:  9.61  Decel Time:  271 msec  MV P1/2 Time:  78.54 msec  MV Area, PHT:  2.80 cm  ²  AI Half  -  time:  612 msec  AI Decel Rate:  1.75 m/s  ²  TR Max Velocity:  2.29 m/s  RA Pressure:  10 mmHg  RVSP/PASP:  31.0 mmHg  Final  Page 1 of 2            Left Atrium:  Moderately enlarged left atrium.  Right Atrium:  The right atrium is normal in size.  Pericardium:  There is no evidence of pericardial effusion.  Mitral Valve:  Thickening of the anterior mitral valve leaflet.  No evidence of mitral stenosis.  Trace mitral   valve regurgitation is seen.  Tricuspid Valve:  Structurally normal tricuspid valve, with normal leaflet excursion.  Mild tricuspid   regurgitation is visualized.  Aortic Valve:  The aortic valve is trileaflet.  No evidence of aortic stenosis.  Sclerotic aortic valve with   normal opening.  Mild aortic valve regurgitation is seen.  Pulmonic Valve:  Structurally normal pulmonic valve, with normal leaflet excursion.  Trace pulmonic valve   regurgitation.  Aorta:    The aortic root and ascending aorta are structurally normal, with no evidence of dilitation.  Venous:  The inferior vena cava was normal sized, with respiratory size variation less than 50%.  Summary:  1.  Left ventricular ejection fraction, by visual estimation, is 35 to 40%.  2.  Mildly to moderately decreased global left ventricular systolic function.  3.  Normal left ventricular internal cavity size.  4.  Spectral Doppler shows impaired relaxation pattern of left ventricular myocardial filling   (Grade I diastolic dysfunction).  5.  There is borderline concentric left ventricular hypertrophy.  6.  There is no evidence of pericardial effusion.  7.  Thickening of the anterior mitral valve leaflet.  8.  Trace mitral valve regurgitation.  9.  Structurally normal tricuspid valve, with normal leaflet excursion.  10.  Mild aortic regurgitation.  11.  Sclerotic aortic valve with normal opening.  12.  Structurally normal pulmonic valve, with normal leaflet excursion.  13.  Moderately enlarged left atrium. TRANSTHORACIC    ECHOCARDIOGRAM REPORT  Patient Name:  VALENTIN WOOD  Patient Location:  [facility] Rec #:  [id number]  Accession #:  [id number]  Account #:  [id number]  Height:  64.0 in  162.6 cm  YOB: 1975  Weight:  294.0 lb  133.36 kg  Patient Age:  47 years  BSA:  2.30 m  ²  Patient Gender:  F  BP:  108  /  80   mmHg  Date of Exam:  2/15/2022  10:35:19 AM  Sonographer:  Francis Rivero RDCS, RVT  Referring Physician:  Renato Crawford  Procedure:  2D Echo/Doppler/Color Doppler Complete.  Indications:  I42.9   -   Cardiomyopathy, unspecified  Diagnosis:  I42.9   -   Cardiomyopathy, unspecified    Summary:  1.  Left ventricular ejection fraction, by visual estimation, is 35 to 40%.  2.  Mildly to moderately decreased global left ventricular systolic function.  3.  Normal left ventricular internal cavity size.  4.  Spectral Doppler shows impaired relaxation pattern of left ventricular myocardial filling   (Grade I diastolic dysfunction).  5.  There is borderline concentric left ventricular hypertrophy.  6.  There is no evidence of pericardial effusion.  7.  Thickening of the anterior mitral valve leaflet.  8.  Trace mitral valve regurgitation.  9.  Structurally normal tricuspid valve, with normal leaflet excursion.  10.  Mild aortic regurgitation.  11.  Sclerotic aortic valve with normal opening.  12.  Structurally normal pulmonic valve, with normal leaflet excursion.  13.  Moderately enlarged left atrium.

## 2022-09-08 NOTE — CHART NOTE - NSCHARTNOTEFT_GEN_A_CORE
Called to bedside @1315 patient initially as gas pain and then subsequently chest pain. Denies feeling like elephant sitting on chest and says it feels like gas.  Given pepcid 20 mg IVP, And simethicone 80 PO. Patient with minimal relief. 12 lead ekg done given CHF hx . D/W Bariatric team. EKG without any significant changes. D/W Dr Nettles will send Cardiac enzymes. Troponin 11.  Green team to Call Dr Koroma, patients HF physician see patient.  Patient OOB and walked to rest room and symptoms are gone at this time.

## 2022-09-09 ENCOUNTER — TRANSCRIPTION ENCOUNTER (OUTPATIENT)
Age: 47
End: 2022-09-09

## 2022-09-09 VITALS
DIASTOLIC BLOOD PRESSURE: 84 MMHG | TEMPERATURE: 98 F | SYSTOLIC BLOOD PRESSURE: 155 MMHG | HEART RATE: 61 BPM | RESPIRATION RATE: 18 BRPM | OXYGEN SATURATION: 100 %

## 2022-09-09 LAB
ANION GAP SERPL CALC-SCNC: 14 MMOL/L — SIGNIFICANT CHANGE UP (ref 5–17)
BASOPHILS # BLD AUTO: 0 K/UL — SIGNIFICANT CHANGE UP (ref 0–0.2)
BASOPHILS NFR BLD AUTO: 0 % — SIGNIFICANT CHANGE UP (ref 0–2)
BUN SERPL-MCNC: 28 MG/DL — HIGH (ref 7–23)
CALCIUM SERPL-MCNC: 9.3 MG/DL — SIGNIFICANT CHANGE UP (ref 8.4–10.5)
CHLORIDE SERPL-SCNC: 102 MMOL/L — SIGNIFICANT CHANGE UP (ref 96–108)
CK MB BLD-MCNC: 1.3 % — SIGNIFICANT CHANGE UP (ref 0–3.5)
CO2 SERPL-SCNC: 18 MMOL/L — LOW (ref 22–31)
CREAT SERPL-MCNC: 1.98 MG/DL — HIGH (ref 0.5–1.3)
EGFR: 31 ML/MIN/1.73M2 — LOW
EOSINOPHIL # BLD AUTO: 0 K/UL — SIGNIFICANT CHANGE UP (ref 0–0.5)
EOSINOPHIL NFR BLD AUTO: 0 % — SIGNIFICANT CHANGE UP (ref 0–6)
GLUCOSE BLDC GLUCOMTR-MCNC: 114 MG/DL — HIGH (ref 70–99)
GLUCOSE BLDC GLUCOMTR-MCNC: 123 MG/DL — HIGH (ref 70–99)
GLUCOSE BLDC GLUCOMTR-MCNC: 91 MG/DL — SIGNIFICANT CHANGE UP (ref 70–99)
GLUCOSE SERPL-MCNC: 110 MG/DL — HIGH (ref 70–99)
HCT VFR BLD CALC: 38.4 % — SIGNIFICANT CHANGE UP (ref 34.5–45)
HGB BLD-MCNC: 12.1 G/DL — SIGNIFICANT CHANGE UP (ref 11.5–15.5)
IMM GRANULOCYTES NFR BLD AUTO: 0.6 % — SIGNIFICANT CHANGE UP (ref 0–1.5)
LYMPHOCYTES # BLD AUTO: 1.59 K/UL — SIGNIFICANT CHANGE UP (ref 1–3.3)
LYMPHOCYTES # BLD AUTO: 16.3 % — SIGNIFICANT CHANGE UP (ref 13–44)
MAGNESIUM SERPL-MCNC: 2.2 MG/DL — SIGNIFICANT CHANGE UP (ref 1.6–2.6)
MCHC RBC-ENTMCNC: 26.7 PG — LOW (ref 27–34)
MCHC RBC-ENTMCNC: 31.5 GM/DL — LOW (ref 32–36)
MCV RBC AUTO: 84.6 FL — SIGNIFICANT CHANGE UP (ref 80–100)
MONOCYTES # BLD AUTO: 0.25 K/UL — SIGNIFICANT CHANGE UP (ref 0–0.9)
MONOCYTES NFR BLD AUTO: 2.6 % — SIGNIFICANT CHANGE UP (ref 2–14)
NEUTROPHILS # BLD AUTO: 7.83 K/UL — HIGH (ref 1.8–7.4)
NEUTROPHILS NFR BLD AUTO: 80.5 % — HIGH (ref 43–77)
NRBC # BLD: 0 /100 WBCS — SIGNIFICANT CHANGE UP (ref 0–0)
PHOSPHATE SERPL-MCNC: 1.9 MG/DL — LOW (ref 2.5–4.5)
PLATELET # BLD AUTO: 317 K/UL — SIGNIFICANT CHANGE UP (ref 150–400)
POTASSIUM SERPL-MCNC: 4.6 MMOL/L — SIGNIFICANT CHANGE UP (ref 3.5–5.3)
POTASSIUM SERPL-SCNC: 4.6 MMOL/L — SIGNIFICANT CHANGE UP (ref 3.5–5.3)
RBC # BLD: 4.54 M/UL — SIGNIFICANT CHANGE UP (ref 3.8–5.2)
RBC # FLD: 14.8 % — HIGH (ref 10.3–14.5)
SODIUM SERPL-SCNC: 134 MMOL/L — LOW (ref 135–145)
WBC # BLD: 9.73 K/UL — SIGNIFICANT CHANGE UP (ref 3.8–10.5)
WBC # FLD AUTO: 9.73 K/UL — SIGNIFICANT CHANGE UP (ref 3.8–10.5)

## 2022-09-09 PROCEDURE — C1889: CPT

## 2022-09-09 PROCEDURE — 82550 ASSAY OF CK (CPK): CPT

## 2022-09-09 PROCEDURE — 82553 CREATINE MB FRACTION: CPT

## 2022-09-09 PROCEDURE — 83735 ASSAY OF MAGNESIUM: CPT

## 2022-09-09 PROCEDURE — 85025 COMPLETE CBC W/AUTO DIFF WBC: CPT

## 2022-09-09 PROCEDURE — 84100 ASSAY OF PHOSPHORUS: CPT

## 2022-09-09 PROCEDURE — 84484 ASSAY OF TROPONIN QUANT: CPT

## 2022-09-09 PROCEDURE — 82962 GLUCOSE BLOOD TEST: CPT

## 2022-09-09 PROCEDURE — 36415 COLL VENOUS BLD VENIPUNCTURE: CPT

## 2022-09-09 PROCEDURE — 80048 BASIC METABOLIC PNL TOTAL CA: CPT

## 2022-09-09 PROCEDURE — C9399: CPT

## 2022-09-09 PROCEDURE — 93005 ELECTROCARDIOGRAM TRACING: CPT

## 2022-09-09 PROCEDURE — 81025 URINE PREGNANCY TEST: CPT

## 2022-09-09 PROCEDURE — 88307 TISSUE EXAM BY PATHOLOGIST: CPT

## 2022-09-09 RX ORDER — ACETAMINOPHEN 500 MG
15 TABLET ORAL
Qty: 300 | Refills: 0
Start: 2022-09-09 | End: 2022-09-13

## 2022-09-09 RX ORDER — FUROSEMIDE 40 MG
1 TABLET ORAL
Qty: 0 | Refills: 0 | DISCHARGE

## 2022-09-09 RX ORDER — ASPIRIN/CALCIUM CARB/MAGNESIUM 324 MG
1 TABLET ORAL
Qty: 0 | Refills: 0 | DISCHARGE

## 2022-09-09 RX ORDER — OMEPRAZOLE 10 MG/1
1 CAPSULE, DELAYED RELEASE ORAL
Qty: 30 | Refills: 0
Start: 2022-09-09 | End: 2022-10-08

## 2022-09-09 RX ORDER — ENOXAPARIN SODIUM 100 MG/ML
40 INJECTION SUBCUTANEOUS
Qty: 5.6 | Refills: 0
Start: 2022-09-09 | End: 2022-09-22

## 2022-09-09 RX ORDER — ASPIRIN/CALCIUM CARB/MAGNESIUM 324 MG
81 TABLET ORAL DAILY
Refills: 0 | Status: DISCONTINUED | OUTPATIENT
Start: 2022-09-09 | End: 2022-09-09

## 2022-09-09 RX ORDER — ONDANSETRON 8 MG/1
1 TABLET, FILM COATED ORAL
Qty: 28 | Refills: 0
Start: 2022-09-09 | End: 2022-09-15

## 2022-09-09 RX ORDER — CARVEDILOL PHOSPHATE 80 MG/1
12.5 CAPSULE, EXTENDED RELEASE ORAL EVERY 12 HOURS
Refills: 0 | Status: DISCONTINUED | OUTPATIENT
Start: 2022-09-09 | End: 2022-09-09

## 2022-09-09 RX ORDER — SPIRONOLACTONE 25 MG/1
1 TABLET, FILM COATED ORAL
Qty: 0 | Refills: 0 | DISCHARGE

## 2022-09-09 RX ADMIN — Medication 200 MILLIGRAM(S): at 03:26

## 2022-09-09 RX ADMIN — Medication 400 MILLIGRAM(S): at 12:36

## 2022-09-09 RX ADMIN — HEPARIN SODIUM 7500 UNIT(S): 5000 INJECTION INTRAVENOUS; SUBCUTANEOUS at 06:14

## 2022-09-09 RX ADMIN — Medication 30 MILLILITER(S): at 08:53

## 2022-09-09 RX ADMIN — ONDANSETRON 4 MILLIGRAM(S): 8 TABLET, FILM COATED ORAL at 17:10

## 2022-09-09 RX ADMIN — Medication 63.75 MILLIMOLE(S): at 09:34

## 2022-09-09 RX ADMIN — ONDANSETRON 4 MILLIGRAM(S): 8 TABLET, FILM COATED ORAL at 12:39

## 2022-09-09 RX ADMIN — CARVEDILOL PHOSPHATE 12.5 MILLIGRAM(S): 80 CAPSULE, EXTENDED RELEASE ORAL at 06:15

## 2022-09-09 RX ADMIN — Medication 1000 MILLIGRAM(S): at 06:29

## 2022-09-09 RX ADMIN — Medication 81 MILLIGRAM(S): at 12:38

## 2022-09-09 RX ADMIN — ONDANSETRON 4 MILLIGRAM(S): 8 TABLET, FILM COATED ORAL at 06:14

## 2022-09-09 RX ADMIN — Medication 400 MILLIGRAM(S): at 06:14

## 2022-09-09 RX ADMIN — PANTOPRAZOLE SODIUM 40 MILLIGRAM(S): 20 TABLET, DELAYED RELEASE ORAL at 17:10

## 2022-09-09 RX ADMIN — HEPARIN SODIUM 7500 UNIT(S): 5000 INJECTION INTRAVENOUS; SUBCUTANEOUS at 15:59

## 2022-09-09 NOTE — PROGRESS NOTE ADULT - ATTENDING COMMENTS
POD1 s/p sleeve gastrectomy  Doing well, tolerating bariatric clears  Minimal incisional pain    Vitals normal  Abdomen soft  Inc c/d/i    Cont bariatric protocol  Encouraged ambulation  Discharge when criteria met    Lucas Diamond MD

## 2022-09-09 NOTE — DISCHARGE NOTE PROVIDER - HOSPITAL COURSE
This is a 48 YO Female PMH HTN, HFrEF (LVEF 35-40%), cardiomyopathy, DM, anemia, mild DAMION (no treatment), renal insufficiency, former smoker, morbid obesity. On September 8, 2022 Ms Adorno underwent Laparoscopic Sleeve Gastrectomy for BMI 48.2. Bariatric ERAS protocol followed to include preoperative and perioperative use of DVT and SSI prophylaxis as well as multi-modal non-opioid analgesics. Patient tolerated the procedure well  extubated in the operating room then transferred to the PACU in stable condition. Once hemodynamically stable and effective pain control the patient was able to ambulate with assistance. Pt was also able to void within 4 hours following the procedure. The patient was later transferred to a bariatric unit and placed on bedside continuous pulse oximetry. Pain management included IV multi-modal non-opioid analgesia then transitioned to liquid as needed.  Bariatric clear liquid diet started the day of surgery.    On POD #1 patient remained stable with no acute events overnight, has effective  pain control. Denies nausea and vomiting. Patient is ambulating independently and voiding as expected. The rest of the hospital course was uneventful and there were no post-operative complications identified.  Patient met 8-Point Bariatric Surgery D/C criteria and subsequently cleared for discharge home on POD#1. LMWH x 14 days extended VTE prophylaxis given ([facility] VTE Risk Stratification Risk low (<1% ). The patient will follow a protocol-derived staged meal progression supervised by the dietitian in the outpatient setting. Patient will follow-up with Dr. Diamond in 7-10 days, medical doctor and dietitian in 30 days. [facility] from [facility] prior to discharge. Written discharge instructions explained and given to include postoperative complications, medications and side effect, diet and  VTE prevention.

## 2022-09-09 NOTE — DISCHARGE NOTE PROVIDER - NSDCFUSCHEDAPPT_GEN_ALL_CORE_FT
[facility]  [facility] [address]  Scheduled Appointment: 09/19/2022

## 2022-09-09 NOTE — DISCHARGE NOTE PROVIDER - CARE PROVIDERS DIRECT ADDRESSES
CALLED AND SPOKE TO PATIENT AND ADVISED.  [place] ,[email or web address]

## 2022-09-09 NOTE — CONSULT NOTE ADULT - SUBJECTIVE AND OBJECTIVE BOX
[facility] DATE OF SERVICE: 09-09-22 @ 15:53    CHIEF COMPLAINT:Patient is a 47y old  Female who presents with a chief complaint of Lap Sleeve Gastrectomy (09 Sep 2022 10:01)      HISTORY OF PRESENT ILLNESS:HPI:  48 YO F PMH HTN, HFrEF (LVEF 35-40%), cardiomyopathy, DM, anemia, mild DAMION (no treatment), renal insufficiency, current some day smoker, morbid obesity (BMI 48.2), presents to PST for LAPAROSCOPIC SLEEVE GASTRECTOMY 9/8/2022. Denies any palpitations, SOB, N/V, Covid symptoms (fever, chills, cough) or exposure.         PAST MEDICAL & SURGICAL HISTORY:  Class 3 severe obesity with body mass index (BMI) greater than or equal to 70 in adult, unspecified obesity type, unspecified whether serious comorbidity present      Essential hypertension      Smoker      CHF (congestive heart failure), NYHA class II, chronic, systolic      Anemia      Type 2 diabetes mellitus      Morbid obesity      2019 novel coronavirus disease (COVID-19)  2020   no hospitalization - lost smell      History of cardiomyopathy      DAMION (obstructive sleep apnea)      History of cholecystectomy  3/2022              MEDICATIONS:  aspirin  chewable 81 milliGRAM(s) Oral daily  carvedilol 12.5 milliGRAM(s) Oral every 12 hours  heparin   Injectable 7500 Unit(s) SubCutaneous every 8 hours        ondansetron Injectable 4 milliGRAM(s) IV Push every 6 hours    aluminum hydroxide/magnesium hydroxide/simethicone Suspension 30 milliLiter(s) Oral every 6 hours PRN  pantoprazole  Injectable 40 milliGRAM(s) IV Push every 24 hours    dextrose 50% Injectable 25 Gram(s) IV Push once  dextrose 50% Injectable 12.5 Gram(s) IV Push once  dextrose 50% Injectable 25 Gram(s) IV Push once  dextrose Oral Gel 15 Gram(s) Oral once PRN  glucagon  Injectable 1 milliGRAM(s) IntraMuscular once  insulin lispro (ADMELOG) corrective regimen sliding scale   SubCutaneous three times a day before meals  insulin lispro (ADMELOG) corrective regimen sliding scale   SubCutaneous at bedtime    dextrose 5%. 1000 milliLiter(s) IV Continuous <Continuous>  dextrose 5%. 1000 milliLiter(s) IV Continuous <Continuous>  influenza   Vaccine 0.5 milliLiter(s) IntraMuscular once  lactated ringers. 1000 milliLiter(s) IV Continuous <Continuous>  sodium chloride 0.9% 1000 milliLiter(s) IV Continuous <Continuous>      FAMILY HISTORY:  Family history of cerebrovascular accident (CVA) (Father)    Family history of lung cancer (Mother)        Non-contributory    SOCIAL HISTORY:    [x] Tobacco Some day smoker  [ ] Drugs  [ ] Alcohol    Allergies    No Known Allergies    Intolerances    	    REVIEW OF SYSTEMS:  CONSTITUTIONAL: No fever  EYES: No eye pain, visual disturbances, or discharge  ENMT:  No difficulty hearing, tinnitus  NECK: No pain or stiffness  RESPIRATORY: No cough, wheezing,  CARDIOVASCULAR: No chest pain, palpitations, passing out, dizziness, or leg swelling  GASTROINTESTINAL:  No nausea, vomiting, diarrhea or constipation. No melena.  GENITOURINARY: No dysuria, hematuria  NEUROLOGICAL: No stroke like symptoms  SKIN: No burning or lesions   ENDOCRINE: No heat or cold intolerance  MUSCULOSKELETAL: No joint pain or swelling  PSYCHIATRIC: No  anxiety, mood swings  HEME/LYMPH: No bleeding gums  ALLERGY AND IMMUNOLOGIC: No hives or eczema	    All other ROS negative    PHYSICAL EXAM:  T(C): 37.1 (09-09-22 @ 13:35), Max: 37.1 (09-09-22 @ 08:43)  HR: 72 (09-09-22 @ 13:35) (61 - 77)  BP: 148/72 (09-09-22 @ 13:35) (119/77 - 148/72)  RR: 18 (09-09-22 @ 13:35) (17 - 20)  SpO2: 99% (09-09-22 @ 13:35) (96% - 100%)  Wt(kg): --  I&O's Summary    08 Sep 2022 07:01  -  09 Sep 2022 07:00  --------------------------------------------------------  IN: 3010 mL / OUT: 1550 mL / NET: 1460 mL    09 Sep 2022 07:01  -  09 Sep 2022 15:53  --------------------------------------------------------  IN: 130 mL / OUT: 0 mL / NET: 130 mL        Appearance: Normal	  HEENT:   Normal oral mucosa, EOMI	  Cardiovascular:  S1 S2, No JVD,    Respiratory: Lungs clear to auscultation	  Psychiatry: Alert  Gastrointestinal:  Soft, Non-tender, + BS	  Skin: No rashes   Neurologic: Non-focal  Extremities:  No edema  Vascular: Peripheral pulses palpable    	    	  	  CARDIAC MARKERS:  Labs personally reviewed by me                                  12.1   9.73  )-----------( 317      ( 09 Sep 2022 07:16 )             38.4     09-09    134<L>  |  102  |  28<H>  ----------------------------<  110<H>  4.6   |  18<L>  |  1.98<H>    Ca    9.3      09 Sep 2022 06:53  Phos  1.9     09-09  Mg     2.2     09-09            Assessment /Plan:   48 YO F PMH HTN, HFrEF (LVEF 35-40%), cardiomyopathy, DM, anemia, mild DAMION (no treatment), renal insufficiency, current some day smoker, morbid obesity (BMI 48.2), presents for lap gastric sleeve.     Problem/Plan  Problem: Chronic HFrEF   Plan: Diuretic medication adjustment s/p bariatric surg, pt euvolemic   D/c Lasix and Spironolactone   C/w Farxiga     Problem/Plan  Problem: HTN  Plan: c/w Coreg    Problem/Plan:  Problem: TOÑO  Plan: Avoid nephrotic meds  Gentle hydration           Differential diagnosis and plan of care discussed with patient after the evaluation. Counseling on diet, nutritional counseling, weight management, exercise and medication compliance was done.   Advanced care planning/advanced directives discussed with patient/family. DNR status including forceful chest compressions to attempt to restart the heart, ventilator support/artificial breathing, electric shock, artificial nutrition, health care proxy, Molst form all discussed with pt. Pt wishes to consider. More than fifteen minutes spent on discussing advanced directives.     CHATO Michael DO [facility]  Cardiovascular Medicine  [address] [place] [address], [address]  Office [phone]  Cell [phone] DATE OF SERVICE: 09-09-22 @ 15:53    CHIEF COMPLAINT:Patient is a 47y old  Female who presents with a chief complaint of Lap Sleeve Gastrectomy (09 Sep 2022 10:01)      HISTORY OF PRESENT ILLNESS:HPI:  48 YO F PMH HTN, HFrEF (LVEF 35-40%), cardiomyopathy, DM, anemia, mild DAMION (no treatment), renal insufficiency, current some day smoker, morbid obesity (BMI 48.2), presents to PST for LAPAROSCOPIC SLEEVE GASTRECTOMY 9/8/2022. Denies any palpitations, SOB, N/V, Covid symptoms (fever, chills, cough) or exposure.         PAST MEDICAL & SURGICAL HISTORY:  Class 3 severe obesity with body mass index (BMI) greater than or equal to 70 in adult, unspecified obesity type, unspecified whether serious comorbidity present      Essential hypertension      Smoker      CHF (congestive heart failure), NYHA class II, chronic, systolic      Anemia      Type 2 diabetes mellitus      Morbid obesity      2019 novel coronavirus disease (COVID-19)  2020   no hospitalization - lost smell      History of cardiomyopathy      DAMION (obstructive sleep apnea)      History of cholecystectomy  3/2022              MEDICATIONS:  aspirin  chewable 81 milliGRAM(s) Oral daily  carvedilol 12.5 milliGRAM(s) Oral every 12 hours  heparin   Injectable 7500 Unit(s) SubCutaneous every 8 hours        ondansetron Injectable 4 milliGRAM(s) IV Push every 6 hours    aluminum hydroxide/magnesium hydroxide/simethicone Suspension 30 milliLiter(s) Oral every 6 hours PRN  pantoprazole  Injectable 40 milliGRAM(s) IV Push every 24 hours    dextrose 50% Injectable 25 Gram(s) IV Push once  dextrose 50% Injectable 12.5 Gram(s) IV Push once  dextrose 50% Injectable 25 Gram(s) IV Push once  dextrose Oral Gel 15 Gram(s) Oral once PRN  glucagon  Injectable 1 milliGRAM(s) IntraMuscular once  insulin lispro (ADMELOG) corrective regimen sliding scale   SubCutaneous three times a day before meals  insulin lispro (ADMELOG) corrective regimen sliding scale   SubCutaneous at bedtime    dextrose 5%. 1000 milliLiter(s) IV Continuous <Continuous>  dextrose 5%. 1000 milliLiter(s) IV Continuous <Continuous>  influenza   Vaccine 0.5 milliLiter(s) IntraMuscular once  lactated ringers. 1000 milliLiter(s) IV Continuous <Continuous>  sodium chloride 0.9% 1000 milliLiter(s) IV Continuous <Continuous>      FAMILY HISTORY:  Family history of cerebrovascular accident (CVA) (Father)    Family history of lung cancer (Mother)        Non-contributory    SOCIAL HISTORY:    [x] Tobacco Some day smoker  [ ] Drugs  [ ] Alcohol    Allergies    No Known Allergies    Intolerances    	    REVIEW OF SYSTEMS:  CONSTITUTIONAL: No fever  EYES: No eye pain, visual disturbances, or discharge  ENMT:  No difficulty hearing, tinnitus  NECK: No pain or stiffness  RESPIRATORY: No cough, wheezing,  CARDIOVASCULAR: No chest pain, palpitations, passing out, dizziness, or leg swelling  GASTROINTESTINAL:  No nausea, vomiting, diarrhea or constipation. No melena.  GENITOURINARY: No dysuria, hematuria  NEUROLOGICAL: No stroke like symptoms  SKIN: No burning or lesions   ENDOCRINE: No heat or cold intolerance  MUSCULOSKELETAL: No joint pain or swelling  PSYCHIATRIC: No  anxiety, mood swings  HEME/LYMPH: No bleeding gums  ALLERGY AND IMMUNOLOGIC: No hives or eczema	    All other ROS negative    PHYSICAL EXAM:  T(C): 37.1 (09-09-22 @ 13:35), Max: 37.1 (09-09-22 @ 08:43)  HR: 72 (09-09-22 @ 13:35) (61 - 77)  BP: 148/72 (09-09-22 @ 13:35) (119/77 - 148/72)  RR: 18 (09-09-22 @ 13:35) (17 - 20)  SpO2: 99% (09-09-22 @ 13:35) (96% - 100%)  Wt(kg): --  I&O's Summary    08 Sep 2022 07:01  -  09 Sep 2022 07:00  --------------------------------------------------------  IN: 3010 mL / OUT: 1550 mL / NET: 1460 mL    09 Sep 2022 07:01  -  09 Sep 2022 15:53  --------------------------------------------------------  IN: 130 mL / OUT: 0 mL / NET: 130 mL        Appearance: Normal	  HEENT:   Normal oral mucosa, EOMI	  Cardiovascular:  S1 S2, No JVD,    Respiratory: Lungs clear to auscultation	  Psychiatry: Alert  Gastrointestinal:  Soft, Non-tender, + BS	  Skin: No rashes   Neurologic: Non-focal  Extremities:  No edema  Vascular: Peripheral pulses palpable    	    	  	  CARDIAC MARKERS:  Labs personally reviewed by me                                  12.1   9.73  )-----------( 317      ( 09 Sep 2022 07:16 )             38.4     09-09    134<L>  |  102  |  28<H>  ----------------------------<  110<H>  4.6   |  18<L>  |  1.98<H>    Ca    9.3      09 Sep 2022 06:53  Phos  1.9     09-09  Mg     2.2     09-09            Assessment /Plan:   48 YO F PMH HTN, HFrEF (LVEF 35-40%), cardiomyopathy, DM, anemia, mild DAMION (no treatment), renal insufficiency, current some day smoker, morbid obesity (BMI 48.2), presents for lap gastric sleeve.     Problem/Plan  Problem: Chronic HFrEF   Plan: Diuretic medication adjustment s/p bariatric surg, pt euvolemic   D/c Lasix and Spironolactone   C/w Farxiga and Entresto    Problem/Plan  Problem: HTN  Plan: c/w Coreg    Problem/Plan:  Problem: TOÑO  Plan: Avoid nephrotic meds  Hold diuretics as noted above    OP follow up with Dr Crawford this Monday in 3 days      Differential diagnosis and plan of care discussed with patient after the evaluation. Counseling on diet, nutritional counseling, weight management, exercise and medication compliance was done.   Advanced care planning/advanced directives discussed with patient/family. DNR status including forceful chest compressions to attempt to restart the heart, ventilator support/artificial breathing, electric shock, artificial nutrition, health care proxy, Molst form all discussed with pt. Pt wishes to consider. More than fifteen minutes spent on discussing advanced directives.     CHATO Michael DO [facility]  Cardiovascular Medicine  [address] [place] [address], [address]  Office [phone]  Cell [phone] DISCHARGE

## 2022-09-09 NOTE — CHART NOTE - NSCHARTNOTEFT_GEN_A_CORE
Chart reviewed, case discussed with HF Attending Dr. Annelise Dickinson. Mrs. Adorno is a 46 YO F w/ HTN, HFrEF (LVEF 35-40%), morbid obesity, former smoker, who is s/p Lap Sleeve Gastrectomy on 9/8/22. She has followed in our office and since last seen she has remained overall stable and compensated, tolerating maximal doses of GDMT. From HF standpoint she not stop her HF medications and should continue her current doses of GDMT which include; Entresto 97/103mg BID, Spironolactone 25mg QD, Farxiga 10mg qd and Coreg 12.5mg BID. Given her recent bariatric surgery would hold loop diuretic for now; Lasix 20 mg bid. Will follow her closely as an outpatient in clinic, for which she is scheduled to follow up on 9/16/22 at 11AM with HF NP, here at [facility] on [address]. She knows to call our office once discharged if she develops symptoms of SOB, LH/Dizziness, orthopnea or PND at [phone]. Chart reviewed, case discussed with HF Attending Dr. Annelise Dickinson. Mrs. Adorno is a 48 YO F w/ HTN, HFrEF (LVEF 35-40%), morbid obesity, former smoker, who is s/p Lap Sleeve Gastrectomy on 9/8/22. She has followed in our office and since last seen she has remained overall stable and compensated, tolerating maximal doses of GDMT. From HF standpoint would avoid further interruption of HF medical therapies and continue her current doses of GDMT which include; Entresto 97/103mg BID, Spironolactone 25mg QD, Farxiga 10mg qd and Coreg 12.5mg BID. Given her recent bariatric surgery would hold loop diuretic for now; Lasix 20 mg bid. Will follow her closely as an outpatient in clinic, for which she is scheduled to follow up on 9/16/22 at 11AM with HF NP, here at [facility] on [address]. She knows to call our office once discharged if she develops symptoms of SOB, LH/Dizziness, orthopnea or PND at [phone].

## 2022-09-09 NOTE — PROGRESS NOTE ADULT - ASSESSMENT
47y F w/ PMH of HTN, HFrEF, cardiomyopathy, T2DM, anemia, mild DAMION, renal insufficiency, morbid obesity, and current smoker now s/p laparoscopic sleeve gastrectomy on 9/8.    Plan:  - Bariatric Clear diet today then protocol derived staged meal progression supervised by RD in outpatient setting  - DVT/GI prophylaxis, Incentive spirometry  - Ambulate Q 2 hours or as indicated  -  Procedure specific education including diet, vitamins and VTE prevention. Written materials given  -  D/C home once Bariatric 8-Point d/c criteria met  -  Follow up with Dr. Diamond in 7-10 days, Dietitian and PMD in 30 days.    [id number] 47y F w/ PMH of HTN, HFrEF, cardiomyopathy, T2DM, anemia, mild DAMION, renal insufficiency, morbid obesity, now s/p laparoscopic sleeve gastrectomy on 9/8.    Plan:  - Bariatric Clear diet today then protocol derived staged meal progression supervised by RD in outpatient setting  - DVT/GI prophylaxis, Incentive spirometry  - Ambulate Q 2 hours or as indicated  -  Procedure specific education including diet, vitamins and VTE prevention. Written materials given  -  D/C home once Bariatric 8-Point d/c criteria met  -  Follow up with Dr. Diamond in 7-10 days, Dietitian and PMD in 30 days.    [facility] [id number]

## 2022-09-09 NOTE — DISCHARGE NOTE PROVIDER - NSDCMRMEDTOKEN_GEN_ALL_CORE_FT
acetaminophen 500 mg/15 mL oral liquid: 15 milliliter(s) orally every 6 hours, As Needed   aspirin 81 mg oral tablet: 1 tab(s) orally once a day. Resume after completing Lovenox injection  carvedilol 12.5 mg oral tablet: 1 tab(s) orally 2 times a day  enoxaparin 40 mg/0.4 mL injectable solution: 40 milligram(s) subcutaneously once a day   Entresto 97 mg-103 mg oral tablet: 1 tab(s) orally 2 times a day  Farxiga 10 mg oral tablet: 1 tab(s) orally once a day  ferrous sulfate 324 mg (65 mg elemental iron) oral tablet: 1 tab(s) orally 2 times a day  omeprazole 40 mg oral delayed release capsule: 1 cap(s) orally once a day MDD:1  ondansetron 4 mg oral tablet, disintegratin tab(s) orally every 6 hours, As Needed

## 2022-09-09 NOTE — DISCHARGE NOTE NURSING/CASE MANAGEMENT/SOCIAL WORK - PATIENT PORTAL LINK FT
You can access the FollowMyHealth Patient Portal offered by [facility] by registering at the following website: [email or web address]. By joining [facility]s FollowMyHealth portal, you will also be able to view your health information using other applications (apps) compatible with our system.

## 2022-09-09 NOTE — DIETITIAN INITIAL EVALUATION ADULT - ADD RECOMMEND
1. Defer diet to team 2. Monitor po intake, wt trends, diet tolerance, and lab values 3. Upon discharge, recommend liquid/chewable/crushable multivitamin+ iron, calcium citrate + Vitamin D, sublingual B12, thiamine.

## 2022-09-09 NOTE — PROGRESS NOTE PEDS - ASSESSMENT
47y F w/ PMH of HTN, HFrEF, cardiomyopathy, T2DM, anemia, mild DAMION, renal insufficiency, morbid obesity, and current smoker now s/p laparoscopic sleeve gastrectomy on 9/8.    Plan:  - Bariatric Clear diet today then protocol derived staged meal progression supervised by RD in outpatient setting  - DVT/GI prophylaxis, Incentive spirometry  - Ambulate Q 2 hours or as indicated  -  Procedure specific education including diet, vitamins and VTE prevention. Written materials given  -  D/C home once Bariatric 8-Point d/c criteria met  -  Follow up with Dr. Diamond in 7-10 days, Dietitian and PMD in 30 days.    [facility] [id number]

## 2022-09-09 NOTE — DIETITIAN INITIAL EVALUATION ADULT - PERTINENT MEDS FT
MEDICATIONS  (STANDING):  acetaminophen   IVPB .. 1000 milliGRAM(s) IV Intermittent once  aspirin  chewable 81 milliGRAM(s) Oral daily  carvedilol 12.5 milliGRAM(s) Oral every 12 hours  dextrose 5%. 1000 milliLiter(s) (50 mL/Hr) IV Continuous <Continuous>  dextrose 5%. 1000 milliLiter(s) (100 mL/Hr) IV Continuous <Continuous>  dextrose 50% Injectable 25 Gram(s) IV Push once  dextrose 50% Injectable 12.5 Gram(s) IV Push once  dextrose 50% Injectable 25 Gram(s) IV Push once  glucagon  Injectable 1 milliGRAM(s) IntraMuscular once  heparin   Injectable 7500 Unit(s) SubCutaneous every 8 hours  influenza   Vaccine 0.5 milliLiter(s) IntraMuscular once  insulin lispro (ADMELOG) corrective regimen sliding scale   SubCutaneous three times a day before meals  insulin lispro (ADMELOG) corrective regimen sliding scale   SubCutaneous at bedtime  lactated ringers. 1000 milliLiter(s) (150 mL/Hr) IV Continuous <Continuous>  ondansetron Injectable 4 milliGRAM(s) IV Push every 6 hours  pantoprazole  Injectable 40 milliGRAM(s) IV Push every 24 hours  sodium chloride 0.9% 1000 milliLiter(s) (250 mL/Hr) IV Continuous <Continuous>    MEDICATIONS  (PRN):  aluminum hydroxide/magnesium hydroxide/simethicone Suspension 30 milliLiter(s) Oral every 6 hours PRN Dyspepsia  dextrose Oral Gel 15 Gram(s) Oral once PRN Blood Glucose LESS THAN 70 milliGRAM(s)/deciliter

## 2022-09-09 NOTE — PHARMACOTHERAPY INTERVENTION NOTE - COMMENTS
S/p sleeve gastrectomy on 2022.  Patient medication reconciliation completed. Patient currently taking:     Home Medications:  aspirin 81 mg oral tablet: 1 tab(s) orally once a day  carvedilol 12.5 mg oral tablet: 1 tab(s) orally 2 times a day  Entresto 97 mg-103 mg oral tablet: 1 tab(s) orally 2 times a day   Farxiga 10 mg oral tablet: 1 tab(s) orally once a day  ferrous sulfate 324 mg (65 mg elemental iron) oral tablet: 1 tab(s) orally 2 times a day   furosemide 20 m tab(s) orally 2 times a day  spironolactone 25 m tab(s) orally once a day    Patient was instructed to use crushed, dissolvable, chewable, or liquid formulations of medications for 1 month after surgery. Patient was informed to take daily multivitamins post surgically. Patient reeducated on NSAID avoidance (ibuprofen, ASA, naproxen, aleve) as they increase risk of GI bleeding; may use APAP for mild pain otherwise contact prescriber for consult. Patient was informed on indications and directions for administration for acetaminophen liquid, omeprazole DR, zofran ODT, and lovenox SQ. Patient was instructed to take the medications as follows:     Crush entresto, carvedilol tabs  Continue vitamins/supplements in chewable/dissolvable forms (iron)  Resume aspirin after completion of lovenox x 2 weeks  Pending discussion with Dr. Crawford (cardiology/PCP) regarding diuretics (furosemide, spironolactone, farxiga)  Anticipating furosemide 20mg daily (PM dose PRN) + spironolactone 12.5-25mg daily (hold farxiga)    Mueksh Gerard, PharmD, BCPS  [phone]  Available on [employer] Teams  S/p sleeve gastrectomy on 2022.  Patient medication reconciliation completed. Patient currently taking:     Home Medications:  aspirin 81 mg oral tablet: 1 tab(s) orally once a day  carvedilol 12.5 mg oral tablet: 1 tab(s) orally 2 times a day  Entresto 97 mg-103 mg oral tablet: 1 tab(s) orally 2 times a day   Farxiga 10 mg oral tablet: 1 tab(s) orally once a day  ferrous sulfate 324 mg (65 mg elemental iron) oral tablet: 1 tab(s) orally 2 times a day   furosemide 20 m tab(s) orally 2 times a day  spironolactone 25 m tab(s) orally once a day    Patient was instructed to use crushed, dissolvable, chewable, or liquid formulations of medications for 1 month after surgery. Patient was informed to take daily multivitamins post surgically. Patient reeducated on NSAID avoidance (ibuprofen, ASA, naproxen, aleve) as they increase risk of GI bleeding; may use APAP for mild pain otherwise contact prescriber for consult. Patient was informed on indications and directions for administration for acetaminophen liquid, omeprazole DR, zofran ODT, and lovenox SQ. Patient was instructed to take the medications as follows:     Crush entresto, carvedilol tabs  Continue vitamins/supplements in chewable/dissolvable forms (iron)  Resume aspirin after completion of lovenox x 2 weeks  Pending discussion with Dr. Crawford (cardiology/PCP) regarding diuretics (furosemide, spironolactone, farxiga)    Mukesh Gerard, PharmD, BCPS  [phone]  Available on [employer] Teams

## 2022-09-09 NOTE — DIETITIAN INITIAL EVALUATION ADULT - PERSON TAUGHT/METHOD
1. Laparoscopic sleeve gastrectomy recommendations reviewed/reinforced (discharge instruction handout references). Bariatric full liquid diet reviewed- sugar free, caffeine free, no carbonated beverages, low fat, no straws, no chewing gum, 6 small meals/day gradually increasing volume (4oz-6oz-8oz), and sipping beverages slowly, no water during meals- drink water 1 hour before or after meals, meeting hydration and protein needs. Discussed vitamin/mineral supplement compliance as discussed with team. Pt encouraged to follow-up with outpatient RD. 2. RD to remain available to reinforce nutrition education as requested by patient/family/caregiver./verbal instruction/written material/patient instructed

## 2022-09-09 NOTE — DIETITIAN INITIAL EVALUATION ADULT - SIGNS/SYMPTOMS
Pt will gradually lose wt towards IBW, verbalize understanding of education, teach back points  BMI: 48.2 kg/m2, previous failed weight loss attempts

## 2022-09-09 NOTE — PROGRESS NOTE ADULT - SUBJECTIVE AND OBJECTIVE BOX
Subjective: Patient had light vaginal spotting overnight. Her last period was in July and likely she is menstruating again. She also reported gas pain, which improved when given simethicone. Started bariatric clear liquid diet and tolerating it  slowly since yesterday.    Objective:   Vital Signs Last 24 Hrs  T(C): 36.9 (09 Sep 2022 00:49), Max: 37 (08 Sep 2022 08:31)  T(F): 98.5 (09 Sep 2022 00:49), Max: 98.6 (08 Sep 2022 08:31)  HR: 73 (09 Sep 2022 00:49) (61 - 77)  BP: 136/84 (09 Sep 2022 00:49) (109/61 - 143/76)  BP(mean): 104 (08 Sep 2022 18:00) (79 - 106)  RR: 18 (09 Sep 2022 00:49) (17 - 20)  SpO2: 98% (09 Sep 2022 00:49) (97% - 100%)    Parameters below as of 09 Sep 2022 00:49  Patient On (Oxygen Delivery Method): room air                                                   I&O's Summary    08 Sep 2022 07:01  -  09 Sep 2022 03:26  --------------------------------------------------------  IN: 795 mL / OUT: 1100 mL / NET: -305 mL                                                                          11.0   8.49  )-----------( 262      ( 08 Sep 2022 13:36 )             35.3                                                 09-08    135  |  103  |  28<H>  ----------------------------<  110<H>  5.0   |  21<L>  |  2.07<H>    Ca    9.0      08 Sep 2022 13:36  Phos  3.0     09-08  Mg     2.2     09-08      acetaminophen   IVPB .. 1000 milliGRAM(s) IV Intermittent once  acetaminophen   IVPB .. 1000 milliGRAM(s) IV Intermittent once  ceFAZolin   IVPB 3000 milliGRAM(s) IV Intermittent once  dextrose 5%. 1000 milliLiter(s) IV Continuous <Continuous>  dextrose 5%. 1000 milliLiter(s) IV Continuous <Continuous>  dextrose 50% Injectable 25 Gram(s) IV Push once  dextrose 50% Injectable 12.5 Gram(s) IV Push once  dextrose 50% Injectable 25 Gram(s) IV Push once  dextrose Oral Gel 15 Gram(s) Oral once PRN  glucagon  Injectable 1 milliGRAM(s) IntraMuscular once  heparin   Injectable 7500 Unit(s) SubCutaneous every 8 hours  hyoscyamine SL 0.125 milliGRAM(s) SubLingual every 4 hours PRN  influenza   Vaccine 0.5 milliLiter(s) IntraMuscular once  insulin lispro (ADMELOG) corrective regimen sliding scale   SubCutaneous three times a day before meals  insulin lispro (ADMELOG) corrective regimen sliding scale   SubCutaneous at bedtime  lactated ringers. 1000 milliLiter(s) IV Continuous <Continuous>  ondansetron Injectable 4 milliGRAM(s) IV Push every 6 hours  pantoprazole  Injectable 40 milliGRAM(s) IV Push every 24 hours  sodium chloride 0.9% 1000 milliLiter(s) IV Continuous <Continuous>      Physical Exam:         Lungs:  clear breath sounds b/l       Heart:  Regular rate & rhythm       Abdomen:  Soft, non-distended.  Scopes sites clean, dry and intact. + bs, - flatus, no rebound or guarding       Skin:  intact, pannus w/o rash       Extremities: + pulses, no edema, no calf tenderness, negative arjun's     VTE Risk Assessment Scores             [facility]  VTE Predicted RISK SCORE: 11        
Post Op Day#: 1    Subjective: "Heartburn, no N/V. comfortable    Objective: No cardio-pulmonary acute events overnight, v/s stable, afebrile. + heartburn, no N/V Continuous pulse oximetry at bedside functioning,  Hypophosphatemic 1.7g/dl.    Ambulated independently around the unit.  Tolerating bariatric clear liquid diet .  Voiding as expected.                                               Vital Signs Last 24 Hrs  T(C): 36.9 (09 Sep 2022 05:40), Max: 37 (08 Sep 2022 08:31)  T(F): 98.5 (09 Sep 2022 05:40), Max: 98.6 (08 Sep 2022 08:31)  HR: 73 (09 Sep 2022 05:40) (61 - 77)  BP: 139/87 (09 Sep 2022 05:40) (109/61 - 143/76)  BP(mean): 104 (08 Sep 2022 18:00) (79 - 106)  RR: 18 (09 Sep 2022 05:40) (17 - 20)  SpO2: 96% (09 Sep 2022 05:40) (96% - 100%)    Parameters below as of 09 Sep 2022 05:40  Patient On (Oxygen Delivery Method): room air                                                   I&O's Summary    08 Sep 2022 07:01  -  09 Sep 2022 07:00  --------------------------------------------------------  IN: 3010 mL / OUT: 1550 mL / NET: 1460 mL                                                                          12.1   9.73  )-----------( 317      ( 09 Sep 2022 07:16 )             38.4                                                 09-09    134<L>  |  102  |  28<H>  ----------------------------<  110<H>  4.6   |  18<L>  |  1.98<H>    Ca    9.3      09 Sep 2022 06:53  Phos  1.9     09-09  Mg     2.2     09-09      acetaminophen   IVPB .. 1000 milliGRAM(s) IV Intermittent once  aluminum hydroxide/magnesium hydroxide/simethicone Suspension 30 milliLiter(s) Oral every 6 hours PRN  aspirin  chewable 81 milliGRAM(s) Oral daily  carvedilol 12.5 milliGRAM(s) Oral every 12 hours  dextrose 5%. 1000 milliLiter(s) IV Continuous <Continuous>  dextrose 5%. 1000 milliLiter(s) IV Continuous <Continuous>  dextrose 50% Injectable 25 Gram(s) IV Push once  dextrose 50% Injectable 12.5 Gram(s) IV Push once  dextrose 50% Injectable 25 Gram(s) IV Push once  dextrose Oral Gel 15 Gram(s) Oral once PRN  glucagon  Injectable 1 milliGRAM(s) IntraMuscular once  heparin   Injectable 7500 Unit(s) SubCutaneous every 8 hours  influenza   Vaccine 0.5 milliLiter(s) IntraMuscular once  insulin lispro (ADMELOG) corrective regimen sliding scale   SubCutaneous three times a day before meals  insulin lispro (ADMELOG) corrective regimen sliding scale   SubCutaneous at bedtime  lactated ringers. 1000 milliLiter(s) IV Continuous <Continuous>  ondansetron Injectable 4 milliGRAM(s) IV Push every 6 hours  pantoprazole  Injectable 40 milliGRAM(s) IV Push every 24 hours  sodium chloride 0.9% 1000 milliLiter(s) IV Continuous <Continuous>  sodium phosphate IVPB 30 milliMole(s) IV Intermittent once  sodium phosphate IVPB 15 milliMole(s) IV Intermittent once      Physical Exam:         Lungs:  clear breath sounds b/l       Heart:  Regular rate & rhythm       Abdomen:  Soft, non-distended.  Scopes sites clean, dry and intact. + bs, - flatus, no rebound or guarding       Skin:  intact, pannus w/o rash       Extremities: + pulses, no edema, no calf tenderness, negative arjun's     VTE Extended Risk Assessment Scores             [facility]  VTE Predicted RISK Low:   (<1% ), extended postoperative VTE prophylaxis      Assessment and Plan: Lap sleeve Gastrectomy POD # 1    - Cardiology team (H) to advise regarding resuming diuretic home meds   - Maalox prn  - Correct electrolyte imbalance  - Bariatric Clear diet today then protocol derived staged meal progression supervised by RD in outpatient setting  -  LMWH x 14 days (Creatinine Clearance 68.9) . Patient education with teach back.  - GI prophylaxis, Incentive spirometry  - Ambulate as tolerated  - Procedure specific education including postop complications, medications and side effects, diet, vitamins and VTE prevention. Written materials given  - Medication reviewed and reconciled, Bariatric meds to be obtained from [facility] prior to d/c  - D/C planning home once Bariatric 8-Point d/c criteria met  - Follow up with Dr Diamond in 7-10 days, Dietitian and PMD in 30 days.      Roseanna Cisneros DNP, ANP  [phone]

## 2022-09-09 NOTE — DIETITIAN INITIAL EVALUATION ADULT - REASON FOR ADMISSION
"48 y/o Female w/ PMH of HTN, HFrEF, cardiomyopathy, T2DM, anemia, mild DAMION, renal insufficiency, morbid obesity, now s/p laparoscopic sleeve gastrectomy on 9/8."

## 2022-09-09 NOTE — DIETITIAN INITIAL EVALUATION ADULT - OTHER INFO
- PMH of DM: HbA1c of 6.7% (8/25/22) indicating adequate glycemic control  - Phos: 1.9<L>; pt ordered for sodium phosphate IV    Pt seen for bariatric surgery consult on 2MON. Pt with multiple previous wt loss attempts per chart. Pt tried the following programs: self-directed direct change, weight watchers and exercise and was unable to lose and maintain significant wt loss. Per chart, pt met with outpatient RD and weighed 297 pounds (4/18/22). Pre-surgical wt (H&P) noted as 281 pounds (8/25/22). Current wt of 280 pounds (9/8/22). Pt now S/P laparoscopic vertical sleeve gastrectomy. Pt denies N+V, sipping on bariatric clear liquids during RD visit. Pt with knowledge of bariatric full liquid diet and receptive to in depth review/reinforcement. Pt reports home stock of protein shakes (Premier Protein) with plans to purchase more. Pt reports purchasing the necessary vitamins/minerals in chewable/liquid/crushable form including a multivitamin with added elemental iron, calcium citrate with vitamin D, and sublingual B12. Pt was advised to take the multivitamin with elemental iron at least 2 hours apart from the calcium citrate with vitamin D for optimal absorption. Pt plans to schedule a follow up appointment with outpatient RD. Pt able to teach back all points discussed during interview.

## 2022-09-09 NOTE — DISCHARGE NOTE PROVIDER - NSDCCPCAREPLAN_GEN_ALL_CORE_FT
PRINCIPAL DISCHARGE DIAGNOSIS  Diagnosis: Morbid (severe) obesity due to excess calories  Assessment and Plan of Treatment:       SECONDARY DISCHARGE DIAGNOSES  Diagnosis: DAMION (obstructive sleep apnea)  Assessment and Plan of Treatment:

## 2022-09-09 NOTE — DISCHARGE NOTE PROVIDER - CARE PROVIDER_API CALL
Lucas Diamond  General Surgery  [address] [place] [address], [address]  [place], [place] [id number]  Phone: [phone]  Fax: [phone]  Follow Up Time:

## 2022-09-09 NOTE — DIETITIAN INITIAL EVALUATION ADULT - NUTRITIONGOAL OUTCOME1
Pt to meet within 75% estimated nutritional needs while inpatient.  Pt will gradually lose wt towards IBW, verbalize understanding of education, teach back points

## 2022-09-09 NOTE — DIETITIAN INITIAL EVALUATION ADULT - PERTINENT LABORATORY DATA
09-09    134<L>  |  102  |  28<H>  ----------------------------<  110<H>  4.6   |  18<L>  |  1.98<H>    Ca    9.3      09 Sep 2022 06:53  Phos  1.9     09-09  Mg     2.2     09-09    POCT Blood Glucose.: 114 mg/dL (09-09-22 @ 08:01)  A1C with Estimated Average Glucose Result: 6.7 % (08-25-22 @ 11:15)

## 2022-09-09 NOTE — DIETITIAN INITIAL EVALUATION ADULT - NS FNS DIET ORDER
Diet, Clear Liquid:   Bariatric Clear Liquid (BARICLLIQ)     Special Instructions for Nursing:  Bariatric Clear Liquid,  start 6 hours postop if no nausea vomiting (09-08-22 @ 13:08)

## 2022-09-09 NOTE — DIETITIAN INITIAL EVALUATION ADULT - ORAL INTAKE PTA/DIET HISTORY
Pt reports following a full liquid diet for 2 weeks PTA as instructed by outpatient RD. Pt reports having ~2 Premier Protein Shakes/day as well as Gen Pro protein powder as well. Pt reports also having juices (which she now knows needs to be sugar free), snapple zero, green tea, crystal light, vegetable & chicken broths. Pt reports following a full liquid diet for 2 weeks PTA as instructed by outpatient RD. Pt reports having ~2 Premier Protein Shakes/day as well as Gen Pro protein powder as well. Pt reports also having juices (which she now knows needs to be sugar free), snapple zero, green tea, crystal light, vegetable & chicken broths. Pt denies food allergies.

## 2022-09-09 NOTE — PROGRESS NOTE PEDS - SUBJECTIVE AND OBJECTIVE BOX
Subjective: Patient had light vaginal spotting overnight. Her last period was in July and likely she is menstruating again. She also reported gas pain, which improved when given simethicone. Started bariatric clear liquid diet and tolerating it  slowly since yesterday.    Objective:   Vital Signs Last 24 Hrs  T(C): 36.9 (09 Sep 2022 00:49), Max: 37 (08 Sep 2022 08:31)  T(F): 98.5 (09 Sep 2022 00:49), Max: 98.6 (08 Sep 2022 08:31)  HR: 73 (09 Sep 2022 00:49) (61 - 77)  BP: 136/84 (09 Sep 2022 00:49) (109/61 - 143/76)  BP(mean): 104 (08 Sep 2022 18:00) (79 - 106)  RR: 18 (09 Sep 2022 00:49) (17 - 20)  SpO2: 98% (09 Sep 2022 00:49) (97% - 100%)    Parameters below as of 09 Sep 2022 00:49  Patient On (Oxygen Delivery Method): room air                                                   I&O's Summary    08 Sep 2022 07:01  -  09 Sep 2022 03:26  --------------------------------------------------------  IN: 795 mL / OUT: 1100 mL / NET: -305 mL                                                                          11.0   8.49  )-----------( 262      ( 08 Sep 2022 13:36 )             35.3                                                 09-08    135  |  103  |  28<H>  ----------------------------<  110<H>  5.0   |  21<L>  |  2.07<H>    Ca    9.0      08 Sep 2022 13:36  Phos  3.0     09-08  Mg     2.2     09-08      acetaminophen   IVPB .. 1000 milliGRAM(s) IV Intermittent once  acetaminophen   IVPB .. 1000 milliGRAM(s) IV Intermittent once  ceFAZolin   IVPB 3000 milliGRAM(s) IV Intermittent once  dextrose 5%. 1000 milliLiter(s) IV Continuous <Continuous>  dextrose 5%. 1000 milliLiter(s) IV Continuous <Continuous>  dextrose 50% Injectable 25 Gram(s) IV Push once  dextrose 50% Injectable 12.5 Gram(s) IV Push once  dextrose 50% Injectable 25 Gram(s) IV Push once  dextrose Oral Gel 15 Gram(s) Oral once PRN  glucagon  Injectable 1 milliGRAM(s) IntraMuscular once  heparin   Injectable 7500 Unit(s) SubCutaneous every 8 hours  hyoscyamine SL 0.125 milliGRAM(s) SubLingual every 4 hours PRN  influenza   Vaccine 0.5 milliLiter(s) IntraMuscular once  insulin lispro (ADMELOG) corrective regimen sliding scale   SubCutaneous three times a day before meals  insulin lispro (ADMELOG) corrective regimen sliding scale   SubCutaneous at bedtime  lactated ringers. 1000 milliLiter(s) IV Continuous <Continuous>  ondansetron Injectable 4 milliGRAM(s) IV Push every 6 hours  pantoprazole  Injectable 40 milliGRAM(s) IV Push every 24 hours  sodium chloride 0.9% 1000 milliLiter(s) IV Continuous <Continuous>      Physical Exam:         Lungs:  clear breath sounds b/l       Heart:  Regular rate & rhythm       Abdomen:  Soft, non-distended.  Scopes sites clean, dry and intact. + bs, - flatus, no rebound or guarding       Skin:  intact, pannus w/o rash       Extremities: + pulses, no edema, no calf tenderness, negative arjun's     VTE Risk Assessment Scores             [facility]  VTE Predicted RISK SCORE: 11

## 2022-09-12 LAB — SURGICAL PATHOLOGY STUDY: SIGNIFICANT CHANGE UP

## 2022-09-13 ENCOUNTER — APPOINTMENT (OUTPATIENT)
Dept: INTERNAL MEDICINE | Facility: CLINIC | Age: 47
End: 2022-09-13

## 2022-09-13 VITALS — WEIGHT: 269.1 LBS | BODY MASS INDEX: 46.19 KG/M2

## 2022-09-13 VITALS
TEMPERATURE: 97.6 F | HEIGHT: 64 IN | DIASTOLIC BLOOD PRESSURE: 78 MMHG | OXYGEN SATURATION: 99 % | HEART RATE: 83 BPM | SYSTOLIC BLOOD PRESSURE: 105 MMHG

## 2022-09-13 DIAGNOSIS — I50.22 CHRONIC SYSTOLIC (CONGESTIVE) HEART FAILURE: ICD-10-CM

## 2022-09-13 DIAGNOSIS — N18.31 CHRONIC KIDNEY DISEASE, STAGE 3A: ICD-10-CM

## 2022-09-13 LAB
ALBUMIN SERPL ELPH-MCNC: 3.9 G/DL
ALP BLD-CCNC: 96 U/L
ALT SERPL-CCNC: 9 U/L
ANION GAP SERPL CALC-SCNC: 14 MMOL/L
AST SERPL-CCNC: 16 U/L
BASOPHILS # BLD AUTO: 0.02 K/UL
BASOPHILS NFR BLD AUTO: 0.2 %
BILIRUB SERPL-MCNC: 0.4 MG/DL
BUN SERPL-MCNC: 17 MG/DL
CALCIUM SERPL-MCNC: 9.3 MG/DL
CHLORIDE SERPL-SCNC: 103 MMOL/L
CO2 SERPL-SCNC: 20 MMOL/L
CREAT SERPL-MCNC: 1.43 MG/DL
EGFR: 45 ML/MIN/1.73M2
EOSINOPHIL # BLD AUTO: 0.28 K/UL
EOSINOPHIL NFR BLD AUTO: 3.2 %
GLUCOSE SERPL-MCNC: 90 MG/DL
HCT VFR BLD CALC: 36.7 %
HGB BLD-MCNC: 11.4 G/DL
IMM GRANULOCYTES NFR BLD AUTO: 0.2 %
LYMPHOCYTES # BLD AUTO: 3.09 K/UL
LYMPHOCYTES NFR BLD AUTO: 35.8 %
MAGNESIUM SERPL-MCNC: 2.1 MG/DL
MAN DIFF?: NORMAL
MCHC RBC-ENTMCNC: 26.7 PG
MCHC RBC-ENTMCNC: 31.1 GM/DL
MCV RBC AUTO: 85.9 FL
MONOCYTES # BLD AUTO: 0.46 K/UL
MONOCYTES NFR BLD AUTO: 5.3 %
NEUTROPHILS # BLD AUTO: 4.77 K/UL
NEUTROPHILS NFR BLD AUTO: 55.3 %
NT-PROBNP SERPL-MCNC: 53 PG/ML
PHOSPHATE SERPL-MCNC: 3.2 MG/DL
PLATELET # BLD AUTO: 303 K/UL
POTASSIUM SERPL-SCNC: 4.3 MMOL/L
PROT SERPL-MCNC: 7.2 G/DL
RBC # BLD: 4.27 M/UL
RBC # FLD: 15 %
SODIUM SERPL-SCNC: 137 MMOL/L
WBC # FLD AUTO: 8.64 K/UL

## 2022-09-13 PROCEDURE — 99496 TRANSJ CARE MGMT HIGH F2F 7D: CPT

## 2022-09-13 RX ORDER — OMEPRAZOLE 20 MG/1
20 CAPSULE, DELAYED RELEASE ORAL DAILY
Qty: 1 | Refills: 0 | Status: ACTIVE | COMMUNITY
Start: 2022-09-13

## 2022-09-13 NOTE — HISTORY OF PRESENT ILLNESS
[Discharged on ___] : discharged on [unfilled] [Discharge Summary] : discharge summary [Pertinent Labs] : pertinent labs [Discharge Med List] : discharge medication list [Med Reconciliation] : medication reconciliation has been completed [Post-hospitalization from ___ Hospital] : Post-hospitalization from [unfilled] Hospital [Admitted on: ___] : The patient was admitted on [unfilled] [FreeTextEntry2] : VALENTIN WOOD is a 47 year old female who presented to the office today for s/p hospitalization for sleeve gastrectomy c/b TOÑO on CKD.  Cr increased to 2.0 (baseline 1.3-1.5) post op thus diuretics lasix and Aldactone held on discharge as cr improved to 1.9. Pt says shes been on liquid diet since surgery,not eating solids. Urinating normally and having BMs. \par Patient feels well aside from generalized fatigue. Denies chest pain, sob, shepard, dizziness, palpitations, LE swelling, syncope, n/v, headache, orthopnea.\par no abd pain, diarrhea.\par \par

## 2022-09-13 NOTE — PHYSICAL EXAM
[No Acute Distress] : no acute distress [Well Nourished] : well nourished [Well Developed] : well developed [Well-Appearing] : well-appearing [Normal Sclera/Conjunctiva] : normal sclera/conjunctiva [EOMI] : extraocular movements intact [Normal Outer Ear/Nose] : the outer ears and nose were normal in appearance [Normal Oropharynx] : the oropharynx was normal [No JVD] : no jugular venous distention [Supple] : supple [No Respiratory Distress] : no respiratory distress  [No Accessory Muscle Use] : no accessory muscle use [Clear to Auscultation] : lungs were clear to auscultation bilaterally [Normal Rate] : normal rate  [Regular Rhythm] : with a regular rhythm [Normal S1, S2] : normal S1 and S2 [No Murmur] : no murmur heard [No Carotid Bruits] : no carotid bruits [No Abdominal Bruit] : a ~M bruit was not heard ~T in the abdomen [No Varicosities] : no varicosities [Pedal Pulses Present] : the pedal pulses are present [No Edema] : there was no peripheral edema [No Palpable Aorta] : no palpable aorta [No Extremity Clubbing/Cyanosis] : no extremity clubbing/cyanosis [Soft] : abdomen soft [Non Tender] : non-tender [Non-distended] : non-distended [Normal Bowel Sounds] : normal bowel sounds [No CVA Tenderness] : no CVA  tenderness [No Spinal Tenderness] : no spinal tenderness [No Joint Swelling] : no joint swelling [Grossly Normal Strength/Tone] : grossly normal strength/tone [No Rash] : no rash [Coordination Grossly Intact] : coordination grossly intact [No Focal Deficits] : no focal deficits [Normal Gait] : normal gait [Normal Affect] : the affect was normal [Normal Insight/Judgement] : insight and judgment were intact [Alert and Oriented x3] : oriented to person, place, and time [de-identified] : steristreps c, d, i, scar healing, no bleeding, erythema or tenderness

## 2022-09-16 ENCOUNTER — APPOINTMENT (OUTPATIENT)
Dept: HEART FAILURE | Facility: CLINIC | Age: 47
End: 2022-09-16

## 2022-09-16 VITALS
SYSTOLIC BLOOD PRESSURE: 91 MMHG | DIASTOLIC BLOOD PRESSURE: 63 MMHG | OXYGEN SATURATION: 98 % | WEIGHT: 264 LBS | HEART RATE: 88 BPM | HEIGHT: 64 IN | BODY MASS INDEX: 45.07 KG/M2

## 2022-09-16 DIAGNOSIS — G47.33 OBSTRUCTIVE SLEEP APNEA (ADULT) (PEDIATRIC): ICD-10-CM

## 2022-09-16 PROCEDURE — 99214 OFFICE O/P EST MOD 30 MIN: CPT

## 2022-09-16 RX ORDER — ENOXAPARIN SODIUM 40 MG/.4ML
40 INJECTION, SOLUTION SUBCUTANEOUS
Qty: 5 | Refills: 0 | Status: ACTIVE | COMMUNITY
Start: 2022-09-09

## 2022-09-16 RX ORDER — OMEPRAZOLE 40 MG/1
40 CAPSULE, DELAYED RELEASE ORAL
Qty: 30 | Refills: 0 | Status: DISCONTINUED | COMMUNITY
Start: 2022-09-09 | End: 2022-09-16

## 2022-09-16 RX ORDER — FUROSEMIDE 20 MG/1
20 TABLET ORAL
Refills: 0 | Status: ACTIVE | COMMUNITY

## 2022-09-19 ENCOUNTER — APPOINTMENT (OUTPATIENT)
Dept: SURGERY | Facility: CLINIC | Age: 47
End: 2022-09-19

## 2022-09-19 VITALS
HEIGHT: 64 IN | OXYGEN SATURATION: 98 % | RESPIRATION RATE: 16 BRPM | DIASTOLIC BLOOD PRESSURE: 61 MMHG | TEMPERATURE: 94.3 F | WEIGHT: 257 LBS | BODY MASS INDEX: 43.87 KG/M2 | SYSTOLIC BLOOD PRESSURE: 95 MMHG | HEART RATE: 72 BPM

## 2022-09-19 PROCEDURE — 99024 POSTOP FOLLOW-UP VISIT: CPT

## 2022-09-19 NOTE — ASSESSMENT
[FreeTextEntry1] : 47-year-old female recovering well status post laparoscopic sleeve gastrectomy 9/8/2022.

## 2022-09-19 NOTE — HISTORY OF PRESENT ILLNESS
[de-identified] : Eren is a 47 year old female here for post op visit s/p Laparoscopic sleeve gastrectomy 9/8/22. \par Recovering comfortably without major complaints.  Minimal incisional pain.  No nausea.  Tolerating bariatric liquids, including protein shakes, without dysphagia or GERD.  Ambulating well.  No fevers or chills reported.

## 2022-09-19 NOTE — PHYSICAL EXAM
[Obese, well nourished, in no acute distress] : obese, well nourished, in no acute distress [Normal] : affect appropriate [de-identified] : Normal respirations [de-identified] : Soft, nontender, nondistended.  Incisions well-healed without erythema or drainage.

## 2022-09-22 PROBLEM — G47.33 OSA (OBSTRUCTIVE SLEEP APNEA): Status: ACTIVE | Noted: 2020-05-19

## 2022-09-22 NOTE — HISTORY OF PRESENT ILLNESS
[FreeTextEntry1] : Mrs. Adorno is a 46 y/o female with obesity (BMI 49), HTN, NICMP, HFrEF (LVEF 35-40%, LVIDd 5.29cm), mild DAMION (does not require CPAP), +COVID (5/2020), former smoker (quit 3 weeks ago, 1/2 ppd x 20 years). She states she was hospitalized in 2018 for high BP, cough, SOB/WICK and was told she had congestive heart failure. She has established care with Cardiologist Dr. Crawford since Feb 2020, who is here for discharge follow up after recent hospitalization after undergoing Lap sleeve Gastrectomy. \par \par  Most recent hospitalization was in 2/2022 for acute abdominal pain and cholecystitis s/p lap cholecystectomy (3/17/22). Of note, she was recently diagnosed with +Hpylori from Endoscopy (3/14) and is on Talicia for a 14 day course. She subsequently developed a yeast infection which she was treated for (has tolerated Farxiga without issue).\par \par Recently underwent Lap sleeve Gastrectomy on 9/8/22 here at [facility] with relatively uncomplicated hospital course. She was discharged off loop diuretics and on her home doses of GDMT. \par \par She presents today and states she has felt well since discharge. She has been gradually increase her daily fluid intake without issues. Currently, only able to consume liquid diet. Denies nausea, vomiting. While she has not resume her prior activity before surgery she remains with AT >2-3 blocks and reassuringly walked to clinic today (~800ft) without limitations. She states her SBP at home has ranged  and her weight has been stable. She has been taking her medications with the exception of matthew which she held under the instruction of surgical team for concerns of dehydration. She denies LH/dizziness, CP, palpitations, syncope, orthopnea, PND, abdominal discomfort, and LE edema.\par \par \par \par \par She is COVID vaccinated but has not yet received the booster.

## 2022-09-22 NOTE — ASSESSMENT
[FreeTextEntry1] : #Chronic systolic heart failure (ACC/AHA Stage C, NYHA Class II)\par - Unknown etiology (?HTN'v CMP) LVEF 35-40%\par - Clinically euvolemic, warm extremities\par -GDMT: Continue Entresto to  mg BID\par             Continue Coreg 18.7 5mg BID \par             Resume Spironolactone 25mg daily\par            -Continue Farxiga 10 mg daily.\par            - Continue Lasix 40 mg daily PRN for weight gain\par - She will continue to monitor daily weight and BP\par -Pt instructed on importance of lifestyle modifications such as low sodium diet and physical activity as tolerated \par - Device: LVEF 35-40%, no clear indication. \par - HF education provided including lifestyle changes (low Na diet, fluid restriction, increase physical activity), current clinical condition, natural progression and prognosis\par - Will have her repeat TTE in Dr. Dash office at next scheduled follow up with him\par - Cardiac rehab referral in the past\par \par #HTN\par -Continue medication regimen as above\par \par #DAMION\par -Follows with pulmonologist Dr. Nichole\par \par #Morbid obesity (BMI 49.6)\par -Pt tentatively planned for bariatric surgery with Dr. Diamond in July\par -Pt reports being seen by a nutritionist\par \par \par Follow up with Dr. Koroma at next available.

## 2022-09-22 NOTE — CARDIOLOGY SUMMARY
[de-identified] : \par 3/31/22: Sinus Rhythm, nonspecific T wave abnormalities\par 3/7/22: Sinus Rhythm, Left anterior fascicular block, SRIRAM, nonspecific T wave abnormalities\par 2/14/22: Sinus Rhythm, SRIRAM, nonspecific T wave abnormalities\par  [de-identified] : 2/9/2020: LVEF 38%, small-moderate sade-apical defect suggestive of ischemia [de-identified] : 2/15/22: LVEF 35-40%, LVIDd 5.29cm, borderline concentric LV hypertrophy, grade I diastolic dysfunction, normal RV size and function, moderately enlarged LA, trace MR, mild AR\par \par 6/9/2021: LVEF 41%, LVIDd 5.45cm, borderline eccentric LV hypertrophy, grade I diastolic dysfunction, RV not well visualized, grossly normal RV, trace MR, mild AR\par \par 2/19/2020: LVEF 40%, LVIDd 5.51cm, borderline eccentric LV hypertrophy, grade I diastolic dysfunction, RV not well visualized, trace MR, mild AR [de-identified] : 2/28/2020: [facility] normal coronaries\par                   RHC: RA-15, PA-51/24/36, PCWP 20 PA sat- 58.7%; severe pHTN. Rosita CO/CI 5.42/2.40

## 2022-09-22 NOTE — PHYSICAL EXAM
[Well Nourished] : well nourished [No Acute Distress] : no acute distress [Clear Lung Fields] : clear lung fields [Soft] : abdomen soft [Non Tender] : non-tender [Normal Gait] : normal gait [No Edema] : no edema [No Rash] : no rash [Moves all extremities] : moves all extremities [Alert and Oriented] : alert and oriented [de-identified] : JVP ~6-8cm of H20 [de-identified] : warm peripherally

## 2022-10-03 ENCOUNTER — APPOINTMENT (OUTPATIENT)
Dept: SURGERY | Facility: CLINIC | Age: 47
End: 2022-10-03

## 2022-10-03 VITALS
DIASTOLIC BLOOD PRESSURE: 64 MMHG | BODY MASS INDEX: 43.54 KG/M2 | WEIGHT: 255 LBS | HEIGHT: 64 IN | SYSTOLIC BLOOD PRESSURE: 132 MMHG | RESPIRATION RATE: 17 BRPM | TEMPERATURE: 96.9 F | OXYGEN SATURATION: 97 % | HEART RATE: 78 BPM

## 2022-10-03 DIAGNOSIS — E66.01 MORBID (SEVERE) OBESITY DUE TO EXCESS CALORIES: ICD-10-CM

## 2022-10-03 PROCEDURE — 99024 POSTOP FOLLOW-UP VISIT: CPT

## 2022-10-03 NOTE — PHYSICAL EXAM
[Obese] : obese [Normal] : affect appropriate [de-identified] : Soft, obese, ND, NT, incisions healing well

## 2022-10-03 NOTE — HISTORY OF PRESENT ILLNESS
[de-identified] : Eren is a 47 year old female here for post op visit s/p Laparoscopic sleeve gastrectomy 9/8/22.\par \par Patient is doing well since her last visit. Tolerating pureed diet, no nausea, vomiting, reflux. Denies any abdominal pain. Walking every day, over 6 thousand steps. Her energy is improving. Normal bowel function, no constipation or diarrhea. Continues to take her bariatric supplements.

## 2022-10-24 ENCOUNTER — APPOINTMENT (OUTPATIENT)
Dept: INTERNAL MEDICINE | Facility: CLINIC | Age: 47
End: 2022-10-24

## 2022-11-08 NOTE — REASON FOR VISIT
[FreeTextEntry3] : Renato Crawford MD [FreeTextEntry1] : Cardiologist- Dr. Crawford\par \par Pulmonologist- Dr. Nichole \par \par Nephrologist- Dr. Levi\par \par Bariatric Surgeon- Dr. Diamond

## 2022-11-08 NOTE — ASSESSMENT
[FreeTextEntry1] : #Chronic systolic heart failure (ACC/AHA Stage C, NYHA Class II)\par - Unknown etiology (?HTN'v CMP) LVEF 35-40%\par - Clinically euvolemic, warm extremities\par -GDMT: Currently on Entresto to  mg BID\par             Currently on Coreg 18.7 5mg BID \par             Currently on Spironolactone 25mg daily\par            Currently on Farxiga 10 mg daily.\par            -Currently on Lasix 40 mg daily PRN for weight gain\par - She will continue to monitor daily weight and BP\par -Pt instructed on importance of lifestyle modifications such as low sodium diet and physical activity as tolerated \par - Device: LVEF 35-40%, no clear indication. \par - HF education provided including lifestyle changes (low Na diet, fluid restriction, increase physical activity), current clinical condition, natural progression and prognosis\par - Will have her repeat TTE in Dr. Dash office at next scheduled follow up with him\par - Cardiac rehab referral in the past\par \par #HTN\par -Continue medication regimen as above\par \par #DAMION\par -Follows with pulmonologist Dr. Nichole\par \par #Morbid obesity (BMI 49.6)\par -Pt tentatively planned for bariatric surgery with Dr. Diamond in July\par -Pt reports being seen by a nutritionist\par \par \par Follow up with Dr. Koroma at next available.

## 2022-11-08 NOTE — HISTORY OF PRESENT ILLNESS
[FreeTextEntry1] : Mrs. Adorno is a 46 y/o female with obesity (BMI 49), HTN, NICMP, HFrEF (LVEF 35-40%, LVIDd 5.29cm), mild DAMION (does not require CPAP), +COVID (5/2020), former smoker (quit 3 weeks ago, 1/2 ppd x 20 years). She states she was hospitalized in 2018 for high BP, cough, SOB/WICK and was told she had congestive heart failure. She has established care with Cardiologist Dr. Crawford since Feb 2020, who is here for discharge follow up after recent hospitalization after undergoing Lap sleeve Gastrectomy. \par \par  Most recent hospitalization was in 2/2022 for acute abdominal pain and cholecystitis s/p lap cholecystectomy (3/17/22). Of note, she was recently diagnosed with +Hpylori from Endoscopy (3/14) and is on Talicia for a 14 day course. She subsequently developed a yeast infection which she was treated for (has tolerated Farxiga without issue).\par \par Since last seen in April she has tolerated escalation of Coreg and has undergone Lap sleeve Gastrectomy on 9/8/22 here at [facility] with relatively uncomplicated hospital course. She was discharged off loop diuretics and on her home doses of GDMT. Followed in clinic post and had been doing well.\par \par She present to clinic today and states she has felt....\par \par _____________________________________________\par labs  stable chemistries BUN stable at 1.4, Pro BNP 53\par Meds: max therapies except BB coreg 18.75\par \par Plan\par Maximize Coreg \par -check labs since resumption of [place] after discharge\par - Next F/u with michelle for timing of next repeat TTE\par \par \par \par She is COVID vaccinated but has not yet received the booster.

## 2022-11-08 NOTE — DISCUSSION/SUMMARY
[FreeTextEntry1] : This is a 46 y/o female with HTN, HFrEF (LVEF 35-40%), morbid obesity, former smoker, who is here for discharge follow up after recent hospitalization after undergoing Lap sleeve Gastrectomy. She appears euvolemic and well perfused. Tolerating maximal doses of GDMT.

## 2022-11-08 NOTE — CARDIOLOGY SUMMARY
[de-identified] : \par 3/31/22: Sinus Rhythm, nonspecific T wave abnormalities\par 3/7/22: Sinus Rhythm, Left anterior fascicular block, SRIRAM, nonspecific T wave abnormalities\par 2/14/22: Sinus Rhythm, SRIRAM, nonspecific T wave abnormalities\par  [de-identified] : 2/9/2020: LVEF 38%, small-moderate sade-apical defect suggestive of ischemia [de-identified] : 2/15/22: LVEF 35-40%, LVIDd 5.29cm, borderline concentric LV hypertrophy, grade I diastolic dysfunction, normal RV size and function, moderately enlarged LA, trace MR, mild AR\par \par 6/9/2021: LVEF 41%, LVIDd 5.45cm, borderline eccentric LV hypertrophy, grade I diastolic dysfunction, RV not well visualized, grossly normal RV, trace MR, mild AR\par \par 2/19/2020: LVEF 40%, LVIDd 5.51cm, borderline eccentric LV hypertrophy, grade I diastolic dysfunction, RV not well visualized, trace MR, mild AR [de-identified] : 2/28/2020: [facility] normal coronaries\par                   RHC: RA-15, PA-51/24/36, PCWP 20 PA sat- 58.7%; severe pHTN. [place] CO/CI 5.42/2.40

## 2022-11-09 ENCOUNTER — APPOINTMENT (OUTPATIENT)
Dept: CV DIAGNOSITCS | Facility: HOSPITAL | Age: 47
End: 2022-11-09

## 2022-11-09 ENCOUNTER — APPOINTMENT (OUTPATIENT)
Dept: HEART FAILURE | Facility: CLINIC | Age: 47
End: 2022-11-09

## 2022-11-19 ENCOUNTER — RX RENEWAL (OUTPATIENT)
Age: 47
End: 2022-11-19

## 2022-12-12 ENCOUNTER — TRANSCRIPTION ENCOUNTER (OUTPATIENT)
Age: 47
End: 2022-12-12

## 2022-12-19 ENCOUNTER — APPOINTMENT (OUTPATIENT)
Dept: SURGERY | Facility: CLINIC | Age: 47
End: 2022-12-19

## 2022-12-30 ENCOUNTER — APPOINTMENT (OUTPATIENT)
Dept: CARDIOLOGY | Facility: CLINIC | Age: 47
End: 2022-12-30
Payer: COMMERCIAL

## 2022-12-30 ENCOUNTER — NON-APPOINTMENT (OUTPATIENT)
Age: 47
End: 2022-12-30

## 2022-12-30 VITALS
HEIGHT: 64 IN | TEMPERATURE: 98.6 F | DIASTOLIC BLOOD PRESSURE: 70 MMHG | WEIGHT: 236 LBS | SYSTOLIC BLOOD PRESSURE: 115 MMHG | OXYGEN SATURATION: 96 % | BODY MASS INDEX: 40.29 KG/M2 | HEART RATE: 89 BPM

## 2022-12-30 DIAGNOSIS — M54.9 DORSALGIA, UNSPECIFIED: ICD-10-CM

## 2022-12-30 PROCEDURE — 99214 OFFICE O/P EST MOD 30 MIN: CPT

## 2022-12-30 PROCEDURE — 93000 ELECTROCARDIOGRAM COMPLETE: CPT

## 2022-12-30 NOTE — HISTORY OF PRESENT ILLNESS
[FreeTextEntry1] : This is a 48 y/o female with a pmhx of CHF, DM, HTN, anemia, renal insufficiency here today for a follow up. pt reports overall doing well post procedure. report some mild lower back pain non raidting denies any recent ruama or heavy lifting \par \par Pt denies any CP, SOB, heart palpitations, dizziness, abdominal pain N/V/D fever or chills\par

## 2023-02-27 ENCOUNTER — APPOINTMENT (OUTPATIENT)
Dept: CARDIOLOGY | Facility: CLINIC | Age: 48
End: 2023-02-27

## 2023-03-05 ENCOUNTER — RX RENEWAL (OUTPATIENT)
Age: 48
End: 2023-03-05

## 2023-03-06 ENCOUNTER — APPOINTMENT (OUTPATIENT)
Dept: CARDIOLOGY | Facility: CLINIC | Age: 48
End: 2023-03-06
Payer: COMMERCIAL

## 2023-03-06 PROCEDURE — 93306 TTE W/DOPPLER COMPLETE: CPT

## 2023-03-09 NOTE — DISCHARGE NOTE NURSING/CASE MANAGEMENT/SOCIAL WORK - NSDCPEFALRISK_GEN_ALL_CORE
"[facility]  Progress Note    Type of Interaction: In Office    Start Time: 11:00 AM    End Time: 12:00 PM        Appointment: Scheduled    Reason for Visit:   Chief Complaint   Patient presents with    Anxiety         Interval History / Patient Symptoms:     Patient Health Questionnaire-9 Score: 5 (3/9/2023  2:43 PM)  KRISH-7 Total Score: 3 (3/9/2023  2:45 PM)    [facility] met with pt for scheduled office visit. Pt reported she is trying to take better care of herself and utilize coping skills to manage her anxiety, because she was hospitalized recently for her heart and GERD. Pt reported she thinks the stress of caring for her partner's mother and continual relationship issues with her partner has taken a tole on her. Pt reported she has started utilizing mindfulness/ grounding techniques and body scanning in her daily routine to manage her stress. Pt reported she feels she needs to prioritize her mental and physical health over everything else because this scared her. Pt reported despite multiple stressors, she has noticed that she is able to manage better than she was prior to treatment.     Interventions Provided: [place] Setting, Psychoeducation, and Mindfulness      Progress Made: Moderate    Response to Intervention:  Pt was engaged throughout session and was receptive towards [facility] feedback and interventions provided. Pt reported coping strategies such as deep breathing and grounding techniques have helped with her mood irritability and anxiety symptoms. Pt engaged in grounding technique in session and reported that it was helpful to \"reset.\" Pt processed multiple stressors that are contributing to her anxiety, but stated she feels she is better able to manage stressors with current medication regimen and coping strategies.         Plan:     Care Plan        Active        Anxiety       Start:  03/09/23          STG: Patient will attend at least 80% of scheduled [facility] sessions       Start:  03/09/23    " Expected End:  09/07/23             STG: Alize will complete at least 80% of assigned homework        Start:  03/09/23    Expected End:  09/07/23             STG: Alize will practice recommended interventions outside of counseling sessions       Start:  03/09/23    Expected End:  09/07/23                   Resolved       There are no resolved problems.                           Patient Instructions   Pt will utilize grounding techniques learned in session  Follow up with [facility] scheduled for 4/4/23.      Follow Up / Next Appointment: 4/4/23        For information on Fall & Injury Prevention, visit: [email or web address] OR  [email or web address] OR  https://www.cdc.gov/steadi/patient.html

## 2023-04-24 ENCOUNTER — APPOINTMENT (OUTPATIENT)
Dept: CARDIOLOGY | Facility: CLINIC | Age: 48
End: 2023-04-24

## 2023-05-29 ENCOUNTER — RX RENEWAL (OUTPATIENT)
Age: 48
End: 2023-05-29

## 2023-05-29 RX ORDER — SACUBITRIL AND VALSARTAN 97; 103 MG/1; MG/1
97-103 TABLET, FILM COATED ORAL
Qty: 180 | Refills: 1 | Status: ACTIVE | COMMUNITY
Start: 2020-02-24 | End: 1900-01-01

## 2023-07-28 ENCOUNTER — APPOINTMENT (OUTPATIENT)
Dept: SURGERY | Facility: CLINIC | Age: 48
End: 2023-07-28
Payer: COMMERCIAL

## 2023-07-28 VITALS
TEMPERATURE: 95.3 F | RESPIRATION RATE: 17 BRPM | BODY MASS INDEX: 37.22 KG/M2 | SYSTOLIC BLOOD PRESSURE: 139 MMHG | HEART RATE: 78 BPM | OXYGEN SATURATION: 100 % | DIASTOLIC BLOOD PRESSURE: 94 MMHG | WEIGHT: 218 LBS | HEIGHT: 64 IN

## 2023-07-28 DIAGNOSIS — Z98.84 BARIATRIC SURGERY STATUS: ICD-10-CM

## 2023-07-28 PROCEDURE — 99213 OFFICE O/P EST LOW 20 MIN: CPT

## 2023-07-28 NOTE — HISTORY OF PRESENT ILLNESS
[de-identified] : Eren is a 48 year old female here for post op visit s/p Laparoscopic sleeve gastrectomy 9/8/22. 10.5 month post operative visit. \par Her weight loss continues to progress.  She reports no dysphagia or reflux.  She walks for exercise, however does not have a regular exercise regimen.\par \par

## 2023-07-28 NOTE — ASSESSMENT
[FreeTextEntry1] : 48-year-old female s/p laparoscopic sleeve gastrectomy on 9/8/2022, doing well\par \par Preoperative weight 269 pounds, BMI 46\par Current weight 218 pounds, BMI 37

## 2023-07-28 NOTE — PHYSICAL EXAM
[Obese, well nourished, in no acute distress] : obese, well nourished, in no acute distress [Normal] : affect appropriate [de-identified] : Normal respirations [de-identified] : Soft, nontender, nondistended.  Incisions well-healed without erythema or drainage.

## 2023-08-26 ENCOUNTER — RX RENEWAL (OUTPATIENT)
Age: 48
End: 2023-08-26

## 2023-08-26 RX ORDER — DAPAGLIFLOZIN 10 MG/1
10 TABLET, FILM COATED ORAL
Qty: 90 | Refills: 0 | Status: ACTIVE | COMMUNITY
Start: 2022-02-15 | End: 1900-01-01

## 2023-10-01 NOTE — DISCHARGE NOTE PROVIDER - NSDCCPTREATMENT_GEN_ALL_CORE_FT
0 PRINCIPAL PROCEDURE  Procedure: Gastrectomy, sleeve  Findings and Treatment: Analgesia, bariatric diet, dietary supplement, increae activity, follow up care

## 2023-10-17 LAB
25(OH)D3 SERPL-MCNC: 33.2 NG/ML
ALBUMIN SERPL ELPH-MCNC: 4.1 G/DL
ALP BLD-CCNC: 119 U/L
ALT SERPL-CCNC: 10 U/L
ANION GAP SERPL CALC-SCNC: 11 MMOL/L
AST SERPL-CCNC: 15 U/L
BILIRUB SERPL-MCNC: 0.6 MG/DL
BUN SERPL-MCNC: 19 MG/DL
CALCIUM SERPL-MCNC: 9.9 MG/DL
CHLORIDE SERPL-SCNC: 106 MMOL/L
CO2 SERPL-SCNC: 26 MMOL/L
CREAT SERPL-MCNC: 1.47 MG/DL
EGFR: 44 ML/MIN/1.73M2
ESTIMATED AVERAGE GLUCOSE: 123 MG/DL
FERRITIN SERPL-MCNC: 31 NG/ML
FOLATE SERPL-MCNC: 17 NG/ML
GLUCOSE SERPL-MCNC: 86 MG/DL
HBA1C MFR BLD HPLC: 5.9 %
IRON SATN MFR SERPL: 30 %
IRON SERPL-MCNC: 96 UG/DL
POTASSIUM SERPL-SCNC: 3.9 MMOL/L
PROT SERPL-MCNC: 6.8 G/DL
SODIUM SERPL-SCNC: 144 MMOL/L
TIBC SERPL-MCNC: 316 UG/DL
UIBC SERPL-MCNC: 220 UG/DL
VIT B12 SERPL-MCNC: 245 PG/ML

## 2023-10-24 LAB — VIT B1 SERPL-MCNC: 81.5 NMOL/L

## 2023-10-31 ENCOUNTER — APPOINTMENT (OUTPATIENT)
Dept: SURGERY | Facility: CLINIC | Age: 48
End: 2023-10-31
Payer: COMMERCIAL

## 2023-10-31 VITALS
HEART RATE: 82 BPM | DIASTOLIC BLOOD PRESSURE: 73 MMHG | SYSTOLIC BLOOD PRESSURE: 106 MMHG | OXYGEN SATURATION: 99 % | RESPIRATION RATE: 18 BRPM | HEIGHT: 64 IN | BODY MASS INDEX: 37.44 KG/M2 | TEMPERATURE: 97 F | WEIGHT: 219.31 LBS

## 2023-10-31 DIAGNOSIS — R73.03 PREDIABETES.: ICD-10-CM

## 2023-10-31 PROCEDURE — 99213 OFFICE O/P EST LOW 20 MIN: CPT

## 2023-11-29 ENCOUNTER — TRANSCRIPTION ENCOUNTER (OUTPATIENT)
Age: 48
End: 2023-11-29

## 2023-12-01 ENCOUNTER — TRANSCRIPTION ENCOUNTER (OUTPATIENT)
Age: 48
End: 2023-12-01

## 2023-12-04 ENCOUNTER — TRANSCRIPTION ENCOUNTER (OUTPATIENT)
Age: 48
End: 2023-12-04

## 2023-12-05 ENCOUNTER — TRANSCRIPTION ENCOUNTER (OUTPATIENT)
Age: 48
End: 2023-12-05

## 2024-01-02 ENCOUNTER — TRANSCRIPTION ENCOUNTER (OUTPATIENT)
Age: 49
End: 2024-01-02

## 2024-01-29 ENCOUNTER — TRANSCRIPTION ENCOUNTER (OUTPATIENT)
Age: 49
End: 2024-01-29

## 2024-01-31 ENCOUNTER — RX RENEWAL (OUTPATIENT)
Age: 49
End: 2024-01-31

## 2024-02-09 ENCOUNTER — APPOINTMENT (OUTPATIENT)
Dept: SURGERY | Facility: CLINIC | Age: 49
End: 2024-02-09
Payer: COMMERCIAL

## 2024-02-09 VITALS
BODY MASS INDEX: 36.02 KG/M2 | HEART RATE: 90 BPM | DIASTOLIC BLOOD PRESSURE: 78 MMHG | RESPIRATION RATE: 17 BRPM | TEMPERATURE: 96.1 F | OXYGEN SATURATION: 98 % | HEIGHT: 64 IN | SYSTOLIC BLOOD PRESSURE: 123 MMHG | WEIGHT: 211 LBS

## 2024-02-09 DIAGNOSIS — Z98.84 BARIATRIC SURGERY STATUS: ICD-10-CM

## 2024-02-09 DIAGNOSIS — E44.1 MILD PROTEIN-CALORIE MALNUTRITION: ICD-10-CM

## 2024-02-09 PROCEDURE — 99213 OFFICE O/P EST LOW 20 MIN: CPT

## 2024-02-09 NOTE — ASSESSMENT
[FreeTextEntry1] : 49-year-old female s/p laparoscopic sleeve gastrectomy on 9/8/2022, doing well  Preoperative weight 269 pounds, BMI 46 Current weight 211 pounds, BMI 36

## 2024-02-09 NOTE — PHYSICAL EXAM
[Normal] : affect appropriate [de-identified] : Normal respirations [de-identified] : Soft, nontender, nondistended.  Incisions well-healed without erythema or drainage.

## 2024-02-09 NOTE — PLAN
[FreeTextEntry1] : [] cont bariatric diet and consult with nutritionist as needed [] goal 30 min cardiovascular exercise daily, with some weight resistance training as tolerated [] continue multivitamins [] f/u 6 months

## 2024-02-09 NOTE — HISTORY OF PRESENT ILLNESS
[de-identified] : Eren is a 50 y/o female here for a follow up visit. s/p Laparoscopic sleeve gastrectomy 9/8/22.  She continues to do very well, with weight loss of over 80 pounds.  She is very happy with her results.  She reports no abdominal pain, dysphagia, or reflux.  She continues to maintain a healthful diet and walks for exercise.

## 2024-03-04 ENCOUNTER — RX RENEWAL (OUTPATIENT)
Age: 49
End: 2024-03-04

## 2024-04-03 ENCOUNTER — TRANSCRIPTION ENCOUNTER (OUTPATIENT)
Age: 49
End: 2024-04-03

## 2024-04-04 NOTE — PATIENT PROFILE ADULT - NSPRESCRUSEDDRG_GEN_A_NUR
Called patient and asked if it would be ok to send prescription to the [facility] pharmacy as they had a bottle of the drop in stock.  He agreed and was told that they would contact him when it was ready.  He was told to keep his scheduled appointment.   
Patient called saying that he wasn't able to get his eye drops last Thursday. Heather said they are not able to get them until 4/12/24. He is wondering if there is an alternative since he has an upcoming appt for a recheck.  
No

## 2024-04-18 ENCOUNTER — APPOINTMENT (OUTPATIENT)
Dept: CARDIOLOGY | Facility: CLINIC | Age: 49
End: 2024-04-18
Payer: COMMERCIAL

## 2024-04-18 ENCOUNTER — RX RENEWAL (OUTPATIENT)
Age: 49
End: 2024-04-18

## 2024-04-18 ENCOUNTER — NON-APPOINTMENT (OUTPATIENT)
Age: 49
End: 2024-04-18

## 2024-04-18 ENCOUNTER — LABORATORY RESULT (OUTPATIENT)
Age: 49
End: 2024-04-18

## 2024-04-18 VITALS
OXYGEN SATURATION: 97 % | SYSTOLIC BLOOD PRESSURE: 140 MMHG | WEIGHT: 206.19 LBS | TEMPERATURE: 98.5 F | DIASTOLIC BLOOD PRESSURE: 86 MMHG | RESPIRATION RATE: 17 BRPM | HEIGHT: 64 IN | HEART RATE: 94 BPM | BODY MASS INDEX: 35.2 KG/M2

## 2024-04-18 DIAGNOSIS — E66.9 OBESITY, UNSPECIFIED: ICD-10-CM

## 2024-04-18 DIAGNOSIS — I50.9 HEART FAILURE, UNSPECIFIED: ICD-10-CM

## 2024-04-18 DIAGNOSIS — Z90.3 ACQUIRED ABSENCE OF STOMACH [PART OF]: ICD-10-CM

## 2024-04-18 DIAGNOSIS — J06.9 ACUTE UPPER RESPIRATORY INFECTION, UNSPECIFIED: ICD-10-CM

## 2024-04-18 DIAGNOSIS — Z13.228 ENCOUNTER FOR SCREENING FOR OTHER METABOLIC DISORDERS: ICD-10-CM

## 2024-04-18 DIAGNOSIS — N28.9 DISORDER OF KIDNEY AND URETER, UNSPECIFIED: ICD-10-CM

## 2024-04-18 DIAGNOSIS — Z01.818 ENCOUNTER FOR OTHER PREPROCEDURAL EXAMINATION: ICD-10-CM

## 2024-04-18 PROCEDURE — 93000 ELECTROCARDIOGRAM COMPLETE: CPT

## 2024-04-18 PROCEDURE — 99214 OFFICE O/P EST MOD 30 MIN: CPT

## 2024-04-18 PROCEDURE — G2211 COMPLEX E/M VISIT ADD ON: CPT

## 2024-04-18 RX ORDER — FLUTICASONE PROPIONATE 50 UG/1
50 SPRAY, METERED NASAL
Qty: 48 | Refills: 0 | Status: ACTIVE | COMMUNITY
Start: 2024-04-18 | End: 1900-01-01

## 2024-04-18 RX ORDER — DOXYCYCLINE 100 MG/1
100 TABLET, FILM COATED ORAL
Qty: 14 | Refills: 0 | Status: ACTIVE | COMMUNITY
Start: 2024-04-18 | End: 1900-01-01

## 2024-04-18 NOTE — PHYSICAL EXAM

## 2024-04-18 NOTE — REVIEW OF SYSTEMS
[Negative] : Heme/Lymph [Sinus Pressure] : sinus pressure [Cough] : cough [FreeTextEntry5] : see hpi

## 2024-04-18 NOTE — HISTORY OF PRESENT ILLNESS
[FreeTextEntry1] : This is a 48 y/o female with a pmhx of CHF, DM, HTN, anemia, renal insufficiency here today for medical clearance for panniculectomy. Patient scheduled for panniculectomy with Dr. Delgado Abraham on 05/02/2024.   She reports of nasal congestion and cough for about 2 days. Denies any fever or chills. pt endorses ocassional sob    Pt denies any CP, , heart palpitations, dizziness, abdominal pain N/V/D fever or chills

## 2024-04-20 ENCOUNTER — APPOINTMENT (OUTPATIENT)
Dept: CARDIOLOGY | Facility: CLINIC | Age: 49
End: 2024-04-20
Payer: COMMERCIAL

## 2024-04-20 ENCOUNTER — NON-APPOINTMENT (OUTPATIENT)
Age: 49
End: 2024-04-20

## 2024-04-20 PROCEDURE — 93306 TTE W/DOPPLER COMPLETE: CPT

## 2024-04-22 ENCOUNTER — NON-APPOINTMENT (OUTPATIENT)
Age: 49
End: 2024-04-22

## 2024-04-22 LAB
INFLUENZA A RESULT: NOT DETECTED
INFLUENZA B RESULT: NOT DETECTED
RESP SYN VIRUS RESULT: NOT DETECTED
SARS-COV-2 RESULT: NOT DETECTED

## 2024-04-24 LAB
25(OH)D3 SERPL-MCNC: 34.6 NG/ML
ALBUMIN SERPL ELPH-MCNC: 3.9 G/DL
ALBUMIN SERPL ELPH-MCNC: 4 G/DL
ALP BLD-CCNC: 100 U/L
ALP BLD-CCNC: 120 U/L
ALT SERPL-CCNC: 9 U/L
ALT SERPL-CCNC: 9 U/L
ANION GAP SERPL CALC-SCNC: 12 MMOL/L
ANION GAP SERPL CALC-SCNC: 12 MMOL/L
APPEARANCE: ABNORMAL
AST SERPL-CCNC: 12 U/L
AST SERPL-CCNC: 9 U/L
BACTERIA: NEGATIVE
BASOPHILS # BLD AUTO: 0.03 K/UL
BASOPHILS # BLD AUTO: 0.05 K/UL
BASOPHILS NFR BLD AUTO: 0.4 %
BASOPHILS NFR BLD AUTO: 0.5 %
BILIRUB DIRECT SERPL-MCNC: 0.1 MG/DL
BILIRUB INDIRECT SERPL-MCNC: 0.3 MG/DL
BILIRUB SERPL-MCNC: 0.4 MG/DL
BILIRUB SERPL-MCNC: 0.5 MG/DL
BILIRUBIN URINE: NEGATIVE
BLOOD URINE: NEGATIVE
BUN SERPL-MCNC: 10 MG/DL
BUN SERPL-MCNC: 15 MG/DL
CALCIUM SERPL-MCNC: 9.5 MG/DL
CALCIUM SERPL-MCNC: 9.8 MG/DL
CHLORIDE SERPL-SCNC: 104 MMOL/L
CHLORIDE SERPL-SCNC: 106 MMOL/L
CHOLEST SERPL-MCNC: 170 MG/DL
CK SERPL-CCNC: 53 U/L
CO2 SERPL-SCNC: 22 MMOL/L
CO2 SERPL-SCNC: 24 MMOL/L
COLOR: YELLOW
CREAT SERPL-MCNC: 1.37 MG/DL
CREAT SERPL-MCNC: 1.52 MG/DL
EGFR: 42 ML/MIN/1.73M2
EGFR: 48 ML/MIN/1.73M2
EOSINOPHIL # BLD AUTO: 0.22 K/UL
EOSINOPHIL # BLD AUTO: 0.39 K/UL
EOSINOPHIL NFR BLD AUTO: 2.9 %
EOSINOPHIL NFR BLD AUTO: 3.9 %
ESTIMATED AVERAGE GLUCOSE: 111 MG/DL
FERRITIN SERPL-MCNC: 98 NG/ML
FOLATE SERPL-MCNC: 15.9 NG/ML
GLUCOSE QUALITATIVE U: ABNORMAL
GLUCOSE SERPL-MCNC: 91 MG/DL
GLUCOSE SERPL-MCNC: 91 MG/DL
HBA1C MFR BLD HPLC: 5.5 %
HCT VFR BLD CALC: 37.5 %
HCT VFR BLD CALC: 44 %
HDLC SERPL-MCNC: 50 MG/DL
HGB BLD-MCNC: 11.7 G/DL
HGB BLD-MCNC: 12.9 G/DL
HYALINE CASTS: 2 /LPF
IMM GRANULOCYTES NFR BLD AUTO: 0.3 %
IMM GRANULOCYTES NFR BLD AUTO: 0.3 %
IRON SATN MFR SERPL: 13 %
IRON SERPL-MCNC: 29 UG/DL
KETONES URINE: NEGATIVE
LDLC SERPL CALC-MCNC: 104 MG/DL
LDLC SERPL DIRECT ASSAY-MCNC: 101 MG/DL
LEUKOCYTE ESTERASE URINE: NEGATIVE
LYMPHOCYTES # BLD AUTO: 2.83 K/UL
LYMPHOCYTES # BLD AUTO: 4.1 K/UL
LYMPHOCYTES NFR BLD AUTO: 37.1 %
LYMPHOCYTES NFR BLD AUTO: 40.7 %
MAGNESIUM SERPL-MCNC: 2.1 MG/DL
MAN DIFF?: NORMAL
MAN DIFF?: NORMAL
MCHC RBC-ENTMCNC: 26.2 PG
MCHC RBC-ENTMCNC: 27.9 PG
MCHC RBC-ENTMCNC: 29.3 GM/DL
MCHC RBC-ENTMCNC: 31.2 GM/DL
MCV RBC AUTO: 89.4 FL
MCV RBC AUTO: 89.5 FL
MICROSCOPIC-UA: NORMAL
MONOCYTES # BLD AUTO: 0.45 K/UL
MONOCYTES # BLD AUTO: 0.5 K/UL
MONOCYTES NFR BLD AUTO: 4.5 %
MONOCYTES NFR BLD AUTO: 6.6 %
NEUTROPHILS # BLD AUTO: 4.03 K/UL
NEUTROPHILS # BLD AUTO: 5.06 K/UL
NEUTROPHILS NFR BLD AUTO: 50.1 %
NEUTROPHILS NFR BLD AUTO: 52.7 %
NITRITE URINE: NEGATIVE
NONHDLC SERPL-MCNC: 120 MG/DL
NT-PROBNP SERPL-MCNC: 150 PG/ML
PH URINE: 6
PLATELET # BLD AUTO: 304 K/UL
PLATELET # BLD AUTO: 346 K/UL
POTASSIUM SERPL-SCNC: 4 MMOL/L
POTASSIUM SERPL-SCNC: 4.2 MMOL/L
PROT SERPL-MCNC: 6.6 G/DL
PROT SERPL-MCNC: 8 G/DL
PROTEIN URINE: ABNORMAL
RBC # BLD: 4.19 M/UL
RBC # BLD: 4.92 M/UL
RBC # FLD: 14.3 %
RBC # FLD: 15.9 %
RED BLOOD CELLS URINE: 4 /HPF
SODIUM SERPL-SCNC: 138 MMOL/L
SODIUM SERPL-SCNC: 141 MMOL/L
SPECIFIC GRAVITY URINE: 1.04
SQUAMOUS EPITHELIAL CELLS: 13 /HPF
TIBC SERPL-MCNC: 230 UG/DL
TRANSFERRIN SERPL-MCNC: 199 MG/DL
TRIGL SERPL-MCNC: 77 MG/DL
TSH SERPL-ACNC: 1.9 UIU/ML
UIBC SERPL-MCNC: 200 UG/DL
UROBILINOGEN URINE: ABNORMAL
VIT B12 SERPL-MCNC: 334 PG/ML
WBC # FLD AUTO: 10.08 K/UL
WBC # FLD AUTO: 7.63 K/UL
WHITE BLOOD CELLS URINE: 6 /HPF

## 2024-04-26 ENCOUNTER — APPOINTMENT (OUTPATIENT)
Dept: CARDIOLOGY | Facility: CLINIC | Age: 49
End: 2024-04-26
Payer: COMMERCIAL

## 2024-04-26 ENCOUNTER — NON-APPOINTMENT (OUTPATIENT)
Age: 49
End: 2024-04-26

## 2024-04-26 PROCEDURE — A9500: CPT

## 2024-04-26 PROCEDURE — 78452 HT MUSCLE IMAGE SPECT MULT: CPT

## 2024-04-26 PROCEDURE — 93015 CV STRESS TEST SUPVJ I&R: CPT

## 2024-04-29 ENCOUNTER — TRANSCRIPTION ENCOUNTER (OUTPATIENT)
Age: 49
End: 2024-04-29

## 2024-04-29 ENCOUNTER — NON-APPOINTMENT (OUTPATIENT)
Age: 49
End: 2024-04-29

## 2024-04-29 LAB
ALBUMIN SERPL ELPH-MCNC: 4.2 G/DL
ALP BLD-CCNC: 116 U/L
ALT SERPL-CCNC: 10 U/L
ANION GAP SERPL CALC-SCNC: 12 MMOL/L
APPEARANCE: CLEAR
AST SERPL-CCNC: 16 U/L
BACTERIA: NEGATIVE /HPF
BASOPHILS # BLD AUTO: 0.05 K/UL
BASOPHILS NFR BLD AUTO: 0.6 %
BILIRUB DIRECT SERPL-MCNC: 0.2 MG/DL
BILIRUB INDIRECT SERPL-MCNC: 0.4 MG/DL
BILIRUB SERPL-MCNC: 0.6 MG/DL
BILIRUBIN URINE: NEGATIVE
BLOOD URINE: NEGATIVE
BUN SERPL-MCNC: 16 MG/DL
CALCIUM SERPL-MCNC: 9.5 MG/DL
CAST: 0 /LPF
CHLORIDE SERPL-SCNC: 104 MMOL/L
CHOLEST SERPL-MCNC: 144 MG/DL
CO2 SERPL-SCNC: 25 MMOL/L
COLOR: YELLOW
CREAT SERPL-MCNC: 1.39 MG/DL
EGFR: 46 ML/MIN/1.73M2
EOSINOPHIL # BLD AUTO: 0.33 K/UL
EOSINOPHIL NFR BLD AUTO: 3.7 %
EPITHELIAL CELLS: 2 /HPF
ESTIMATED AVERAGE GLUCOSE: 108 MG/DL
FERRITIN SERPL-MCNC: 67 NG/ML
FOLATE SERPL-MCNC: 15.2 NG/ML
GLUCOSE QUALITATIVE U: NEGATIVE MG/DL
GLUCOSE SERPL-MCNC: 81 MG/DL
HBA1C MFR BLD HPLC: 5.4 %
HCT VFR BLD CALC: 37.4 %
HDLC SERPL-MCNC: 60 MG/DL
HGB BLD-MCNC: 11.6 G/DL
IMM GRANULOCYTES NFR BLD AUTO: 0.2 %
IRON SERPL-MCNC: 39 UG/DL
KETONES URINE: NEGATIVE MG/DL
LDLC SERPL CALC-MCNC: 71 MG/DL
LEUKOCYTE ESTERASE URINE: NEGATIVE
LYMPHOCYTES # BLD AUTO: 2.48 K/UL
LYMPHOCYTES NFR BLD AUTO: 27.8 %
MAGNESIUM SERPL-MCNC: 2.2 MG/DL
MAN DIFF?: NORMAL
MCHC RBC-ENTMCNC: 26.5 PG
MCHC RBC-ENTMCNC: 31 GM/DL
MCV RBC AUTO: 85.6 FL
MICROSCOPIC-UA: NORMAL
MONOCYTES # BLD AUTO: 0.42 K/UL
MONOCYTES NFR BLD AUTO: 4.7 %
NEUTROPHILS # BLD AUTO: 5.62 K/UL
NEUTROPHILS NFR BLD AUTO: 63 %
NITRITE URINE: NEGATIVE
NONHDLC SERPL-MCNC: 84 MG/DL
NT-PROBNP SERPL-MCNC: 329 PG/ML
PH URINE: 6
PLATELET # BLD AUTO: 281 K/UL
POTASSIUM SERPL-SCNC: 4 MMOL/L
PROT SERPL-MCNC: 7.1 G/DL
PROTEIN URINE: NEGATIVE MG/DL
RAPID RVP RESULT: NOT DETECTED
RBC # BLD: 4.37 M/UL
RBC # FLD: 13.6 %
RED BLOOD CELLS URINE: 0 /HPF
SARS-COV-2 RNA PNL RESP NAA+PROBE: NOT DETECTED
SODIUM SERPL-SCNC: 141 MMOL/L
SPECIFIC GRAVITY URINE: 1.02
T3FREE SERPL-MCNC: 2.88 PG/ML
T4 FREE SERPL-MCNC: 1.2 NG/DL
TRIGL SERPL-MCNC: 60 MG/DL
TSH SERPL-ACNC: 1.86 UIU/ML
UROBILINOGEN URINE: 0.2 MG/DL
VIT B12 SERPL-MCNC: 336 PG/ML
WBC # FLD AUTO: 8.92 K/UL
WHITE BLOOD CELLS URINE: 0 /HPF

## 2024-04-30 ENCOUNTER — APPOINTMENT (OUTPATIENT)
Dept: CT IMAGING | Facility: CLINIC | Age: 49
End: 2024-04-30
Payer: COMMERCIAL

## 2024-04-30 PROCEDURE — 75574 CT ANGIO HRT W/3D IMAGE: CPT

## 2024-05-01 ENCOUNTER — NON-APPOINTMENT (OUTPATIENT)
Age: 49
End: 2024-05-01

## 2024-05-01 ENCOUNTER — TRANSCRIPTION ENCOUNTER (OUTPATIENT)
Age: 49
End: 2024-05-01

## 2024-05-11 ENCOUNTER — RX RENEWAL (OUTPATIENT)
Age: 49
End: 2024-05-11

## 2024-05-11 RX ORDER — SPIRONOLACTONE 25 MG/1
25 TABLET ORAL
Qty: 90 | Refills: 1 | Status: ACTIVE | COMMUNITY
Start: 2022-03-07 | End: 1900-01-01

## 2024-05-13 NOTE — H&P PST ADULT - FALL HARM RISK - FALL HARM RISK
Take medication as prescribed.   Keep area clean and dry.  Wash daily with antibacterial soap and water. follow-up with your primary care physician within 2 days for reassessment. Bring the results from this visit with you for their review. Return to the ED immediately for any new, worsening, or persistent symptoms, including fever, vomiting, chest pain, shortness of breath, or any other medical concerns.    
No indicators present

## 2024-05-28 ENCOUNTER — NON-APPOINTMENT (OUTPATIENT)
Age: 49
End: 2024-05-28

## 2024-05-28 ENCOUNTER — APPOINTMENT (OUTPATIENT)
Dept: INTERNAL MEDICINE | Facility: CLINIC | Age: 49
End: 2024-05-28
Payer: COMMERCIAL

## 2024-05-28 VITALS
OXYGEN SATURATION: 98 % | BODY MASS INDEX: 33.8 KG/M2 | TEMPERATURE: 98.3 F | SYSTOLIC BLOOD PRESSURE: 128 MMHG | HEART RATE: 82 BPM | HEIGHT: 64 IN | WEIGHT: 198 LBS | DIASTOLIC BLOOD PRESSURE: 72 MMHG

## 2024-05-28 DIAGNOSIS — E11.9 TYPE 2 DIABETES MELLITUS W/OUT COMPLICATIONS: ICD-10-CM

## 2024-05-28 DIAGNOSIS — I42.9 CARDIOMYOPATHY, UNSPECIFIED: ICD-10-CM

## 2024-05-28 DIAGNOSIS — D64.9 ANEMIA, UNSPECIFIED: ICD-10-CM

## 2024-05-28 DIAGNOSIS — I10 ESSENTIAL (PRIMARY) HYPERTENSION: ICD-10-CM

## 2024-05-28 DIAGNOSIS — Z02.1 ENCOUNTER FOR PRE-EMPLOYMENT EXAMINATION: ICD-10-CM

## 2024-05-28 PROCEDURE — G2211 COMPLEX E/M VISIT ADD ON: CPT

## 2024-05-28 PROCEDURE — 99213 OFFICE O/P EST LOW 20 MIN: CPT

## 2024-05-28 PROCEDURE — 93000 ELECTROCARDIOGRAM COMPLETE: CPT

## 2024-05-28 NOTE — HEALTH RISK ASSESSMENT
[0] : 2) Feeling down, depressed, or hopeless: Not at all (0) [PHQ-2 Negative - No further assessment needed] : PHQ-2 Negative - No further assessment needed [Current] : Current [[id number]] : 0

## 2024-05-28 NOTE — ADDENDUM
[FreeTextEntry1] : This note was written by Norman Petty on 05/28/2024 acting as medical scribe for Dr. Db Fatima. I, Dr. Db Fatima, have read and attest that all the information, medical decision making and discharge instructions within are true and accurate.

## 2024-05-28 NOTE — PHYSICAL EXAM
[No Acute Distress] : no acute distress [Well Nourished] : well nourished [Well Developed] : well developed [Well-Appearing] : well-appearing [Normal Sclera/Conjunctiva] : normal sclera/conjunctiva [PERRL] : pupils equal round and reactive to light [EOMI] : extraocular movements intact [Normal Outer Ear/Nose] : the outer ears and nose were normal in appearance [Normal Oropharynx] : the oropharynx was normal [No JVD] : no jugular venous distention [No Lymphadenopathy] : no lymphadenopathy [Supple] : supple [Thyroid Normal, No Nodules] : the thyroid was normal and there were no nodules present [No Respiratory Distress] : no respiratory distress  [No Accessory Muscle Use] : no accessory muscle use [Clear to Auscultation] : lungs were clear to auscultation bilaterally [Normal Rate] : normal rate  [Regular Rhythm] : with a regular rhythm [Normal S1, S2] : normal S1 and S2 [No Murmur] : no murmur heard [No Carotid Bruits] : no carotid bruits [No Abdominal Bruit] : a ~M bruit was not heard ~T in the abdomen [No Varicosities] : no varicosities [Pedal Pulses Present] : the pedal pulses are present [No Edema] : there was no peripheral edema [No Palpable Aorta] : no palpable aorta [No Extremity Clubbing/Cyanosis] : no extremity clubbing/cyanosis [Soft] : abdomen soft [Non-distended] : non-distended [No Masses] : no abdominal mass palpated [No HSM] : no HSM [Normal Bowel Sounds] : normal bowel sounds [Normal Posterior Cervical Nodes] : no posterior cervical lymphadenopathy [Normal Anterior Cervical Nodes] : no anterior cervical lymphadenopathy [No CVA Tenderness] : no CVA  tenderness [No Spinal Tenderness] : no spinal tenderness [No Joint Swelling] : no joint swelling [Grossly Normal Strength/Tone] : grossly normal strength/tone [No Rash] : no rash [Coordination Grossly Intact] : coordination grossly intact [No Focal Deficits] : no focal deficits [Normal Gait] : normal gait [Normal Affect] : the affect was normal [Normal Insight/Judgement] : insight and judgment were intact [Non Tender] : non-tender [Alert and Oriented x3] : oriented to person, place, and time

## 2024-05-28 NOTE — HISTORY OF PRESENT ILLNESS
[FreeTextEntry8] : CC: medical clearance to return to work s/p recent panniculectomy  This is a 48 y/o female with a pmhx of CHF, DM, HTN, anemia, renal insufficiency, here today for medical clearance to return to work after recent panniculectomy on 05/02/2024 with Dr. Delgado Abraham. Works as  at [employer] and job does not requiring any lifting, mostly desk work. Patient feels well. No complaints. Denies chest pain, sob, shepard, dizziness, orthopnea, diaphoresis, palpitations, LE swelling, syncope, n/v, headache. No abd pain, having BMs, doing well

## 2024-06-28 ENCOUNTER — RX RENEWAL (OUTPATIENT)
Age: 49
End: 2024-06-28

## 2024-07-30 NOTE — DISCHARGE NOTE NURSING/CASE MANAGEMENT/SOCIAL WORK - PATIENT PORTAL LINK FT
Patient in room ORTHO 4007. I have received report from Edwar PENA and had the opportunity 
to ask questions and assume patient care. You can access the FollowMyHealth Patient Portal offered by [facility] by registering at the following website: [email or web address]. By joining [facility]’s FollowMyHealth portal, you will also be able to view your health information using other applications (apps) compatible with our system. Yes Yes Yes Yes

## 2024-08-16 ENCOUNTER — APPOINTMENT (OUTPATIENT)
Dept: CARDIOLOGY | Facility: CLINIC | Age: 49
End: 2024-08-16

## 2024-08-16 VITALS
BODY MASS INDEX: 32.78 KG/M2 | DIASTOLIC BLOOD PRESSURE: 90 MMHG | WEIGHT: 192 LBS | OXYGEN SATURATION: 99 % | HEART RATE: 91 BPM | SYSTOLIC BLOOD PRESSURE: 130 MMHG | HEIGHT: 64 IN | TEMPERATURE: 98.2 F

## 2024-08-16 DIAGNOSIS — I42.9 CARDIOMYOPATHY, UNSPECIFIED: ICD-10-CM

## 2024-08-16 DIAGNOSIS — I50.9 HEART FAILURE, UNSPECIFIED: ICD-10-CM

## 2024-08-16 DIAGNOSIS — D64.9 ANEMIA, UNSPECIFIED: ICD-10-CM

## 2024-08-16 DIAGNOSIS — Z90.3 ACQUIRED ABSENCE OF STOMACH [PART OF]: ICD-10-CM

## 2024-08-16 DIAGNOSIS — E66.9 OBESITY, UNSPECIFIED: ICD-10-CM

## 2024-08-16 DIAGNOSIS — Z13.228 ENCOUNTER FOR SCREENING FOR OTHER METABOLIC DISORDERS: ICD-10-CM

## 2024-08-16 DIAGNOSIS — Z12.12 ENCOUNTER FOR SCREENING FOR MALIGNANT NEOPLASM OF COLON: ICD-10-CM

## 2024-08-16 DIAGNOSIS — N28.9 DISORDER OF KIDNEY AND URETER, UNSPECIFIED: ICD-10-CM

## 2024-08-16 DIAGNOSIS — Z12.11 ENCOUNTER FOR SCREENING FOR MALIGNANT NEOPLASM OF COLON: ICD-10-CM

## 2024-08-16 DIAGNOSIS — F17.200 NICOTINE DEPENDENCE, UNSPECIFIED, UNCOMPLICATED: ICD-10-CM

## 2024-08-16 DIAGNOSIS — I10 ESSENTIAL (PRIMARY) HYPERTENSION: ICD-10-CM

## 2024-08-16 DIAGNOSIS — E11.9 TYPE 2 DIABETES MELLITUS W/OUT COMPLICATIONS: ICD-10-CM

## 2024-08-16 LAB
ANION GAP SERPL CALC-SCNC: 15 MMOL/L
APPEARANCE: CLEAR
BACTERIA: NEGATIVE /HPF
BASOPHILS # BLD AUTO: 0.03 K/UL
BASOPHILS NFR BLD AUTO: 0.4 %
BILIRUBIN URINE: NEGATIVE
BLOOD URINE: NEGATIVE
BUN SERPL-MCNC: 12 MG/DL
CALCIUM SERPL-MCNC: 9.3 MG/DL
CAST: 0 /LPF
CHLORIDE SERPL-SCNC: 103 MMOL/L
CO2 SERPL-SCNC: 25 MMOL/L
COLOR: YELLOW
CREAT SERPL-MCNC: 1.3 MG/DL
EGFR: 50 ML/MIN/1.73M2
EOSINOPHIL # BLD AUTO: 0.27 K/UL
EOSINOPHIL NFR BLD AUTO: 3.8 %
EPITHELIAL CELLS: 4 /HPF
ESTIMATED AVERAGE GLUCOSE: 108 MG/DL
FERRITIN SERPL-MCNC: 27 NG/ML
FOLATE SERPL-MCNC: 12.1 NG/ML
GLUCOSE QUALITATIVE U: NEGATIVE MG/DL
GLUCOSE SERPL-MCNC: 74 MG/DL
HBA1C MFR BLD HPLC: 5.4 %
HCT VFR BLD CALC: 36.7 %
HGB BLD-MCNC: 11.5 G/DL
IMM GRANULOCYTES NFR BLD AUTO: 0.3 %
IRON SATN MFR SERPL: 11 %
IRON SERPL-MCNC: 34 UG/DL
KETONES URINE: NEGATIVE MG/DL
LEUKOCYTE ESTERASE URINE: NEGATIVE
LYMPHOCYTES # BLD AUTO: 3.22 K/UL
LYMPHOCYTES NFR BLD AUTO: 44.8 %
MAGNESIUM SERPL-MCNC: 2 MG/DL
MAN DIFF?: NORMAL
MCHC RBC-ENTMCNC: 25.7 PG
MCHC RBC-ENTMCNC: 31.3 GM/DL
MCV RBC AUTO: 82.1 FL
MICROSCOPIC-UA: NORMAL
MONOCYTES # BLD AUTO: 0.57 K/UL
MONOCYTES NFR BLD AUTO: 7.9 %
NEUTROPHILS # BLD AUTO: 3.08 K/UL
NEUTROPHILS NFR BLD AUTO: 42.8 %
NITRITE URINE: NEGATIVE
NT-PROBNP SERPL-MCNC: 1120 PG/ML
PH URINE: 6
PLATELET # BLD AUTO: 339 K/UL
POTASSIUM SERPL-SCNC: 4.7 MMOL/L
PROTEIN URINE: NEGATIVE MG/DL
RBC # BLD: 4.47 M/UL
RBC # FLD: 15.2 %
RED BLOOD CELLS URINE: 1 /HPF
SODIUM SERPL-SCNC: 142 MMOL/L
SPECIFIC GRAVITY URINE: 1.02
T4 FREE SERPL-MCNC: 1.1 NG/DL
TIBC SERPL-MCNC: 310 UG/DL
TSH SERPL-ACNC: 1.68 UIU/ML
UIBC SERPL-MCNC: 277 UG/DL
UROBILINOGEN URINE: 0.2 MG/DL
VIT B12 SERPL-MCNC: 273 PG/ML
WBC # FLD AUTO: 7.19 K/UL
WHITE BLOOD CELLS URINE: 1 /HPF

## 2024-08-16 PROCEDURE — G2211 COMPLEX E/M VISIT ADD ON: CPT

## 2024-08-16 PROCEDURE — 93000 ELECTROCARDIOGRAM COMPLETE: CPT

## 2024-08-16 PROCEDURE — 99214 OFFICE O/P EST MOD 30 MIN: CPT

## 2024-08-16 NOTE — HISTORY OF PRESENT ILLNESS
[FreeTextEntry1] : This is a 50 y/o female with a pmhx of CHF, DM, HTN, anemia, renal insufficiency here today for a follow up. Patient is s/p panniculectomy 5/2024 with Dr. Abraham, recovering well. pt denies any chest  pain dizziness ,lightheadedness ,nausea vomiting diaphoresis

## 2024-08-22 ENCOUNTER — RX RENEWAL (OUTPATIENT)
Age: 49
End: 2024-08-22

## 2024-09-04 NOTE — HISTORY OF PRESENT ILLNESS
[de-identified] : Eren is a 50 y/o female here for a follow up visit. s/p Laparoscopic sleeve gastrectomy 9/8/22.

## 2024-09-20 ENCOUNTER — APPOINTMENT (OUTPATIENT)
Dept: SURGERY | Facility: CLINIC | Age: 49
End: 2024-09-20

## 2024-10-15 ENCOUNTER — TRANSCRIPTION ENCOUNTER (OUTPATIENT)
Age: 49
End: 2024-10-15

## 2024-10-16 ENCOUNTER — TRANSCRIPTION ENCOUNTER (OUTPATIENT)
Age: 49
End: 2024-10-16

## 2024-10-16 RX ORDER — TIRZEPATIDE 10 MG/.5ML
10 INJECTION, SOLUTION SUBCUTANEOUS
Qty: 12 | Refills: 0 | Status: ACTIVE | COMMUNITY
Start: 2024-10-07 | End: 1900-01-01

## 2024-10-17 ENCOUNTER — TRANSCRIPTION ENCOUNTER (OUTPATIENT)
Age: 49
End: 2024-10-17
